# Patient Record
Sex: MALE | Race: WHITE | URBAN - METROPOLITAN AREA
[De-identification: names, ages, dates, MRNs, and addresses within clinical notes are randomized per-mention and may not be internally consistent; named-entity substitution may affect disease eponyms.]

---

## 2017-09-08 ENCOUNTER — INPATIENT (INPATIENT)
Facility: HOSPITAL | Age: 67
LOS: 1 days | Discharge: HOME | End: 2017-09-10
Attending: HOSPITALIST | Admitting: HOSPITALIST
Payer: COMMERCIAL

## 2017-09-08 PROCEDURE — 93010 ELECTROCARDIOGRAM REPORT: CPT

## 2017-09-09 VITALS
OXYGEN SATURATION: 98 % | TEMPERATURE: 99 F | HEART RATE: 74 BPM | DIASTOLIC BLOOD PRESSURE: 76 MMHG | RESPIRATION RATE: 17 BRPM | SYSTOLIC BLOOD PRESSURE: 145 MMHG

## 2017-09-09 LAB
ALBUMIN SERPL ELPH-MCNC: 3.9 G/DL — SIGNIFICANT CHANGE UP (ref 3.3–5)
ALP SERPL-CCNC: 69 U/L — SIGNIFICANT CHANGE UP (ref 40–120)
ALT FLD-CCNC: 35 U/L — SIGNIFICANT CHANGE UP (ref 12–78)
ANION GAP SERPL CALC-SCNC: 10 MMOL/L — SIGNIFICANT CHANGE UP (ref 5–17)
ANION GAP SERPL CALC-SCNC: 9 MMOL/L — SIGNIFICANT CHANGE UP (ref 5–17)
APTT BLD: 31 SEC — SIGNIFICANT CHANGE UP (ref 27.5–37.4)
AST SERPL-CCNC: 21 U/L — SIGNIFICANT CHANGE UP (ref 15–37)
BASOPHILS # BLD AUTO: 0.1 K/UL — SIGNIFICANT CHANGE UP (ref 0–0.2)
BASOPHILS NFR BLD AUTO: 1.2 % — SIGNIFICANT CHANGE UP (ref 0–2)
BILIRUB SERPL-MCNC: 0.3 MG/DL — SIGNIFICANT CHANGE UP (ref 0.2–1.2)
BUN SERPL-MCNC: 16 MG/DL — SIGNIFICANT CHANGE UP (ref 7–23)
BUN SERPL-MCNC: 17 MG/DL — SIGNIFICANT CHANGE UP (ref 7–23)
CALCIUM SERPL-MCNC: 8.3 MG/DL — LOW (ref 8.5–10.1)
CALCIUM SERPL-MCNC: 8.6 MG/DL — SIGNIFICANT CHANGE UP (ref 8.5–10.1)
CHLORIDE SERPL-SCNC: 107 MMOL/L — SIGNIFICANT CHANGE UP (ref 96–108)
CHLORIDE SERPL-SCNC: 107 MMOL/L — SIGNIFICANT CHANGE UP (ref 96–108)
CHOLEST SERPL-MCNC: 172 MG/DL — SIGNIFICANT CHANGE UP (ref 10–199)
CO2 SERPL-SCNC: 25 MMOL/L — SIGNIFICANT CHANGE UP (ref 22–31)
CO2 SERPL-SCNC: 26 MMOL/L — SIGNIFICANT CHANGE UP (ref 22–31)
CREAT SERPL-MCNC: 0.67 MG/DL — SIGNIFICANT CHANGE UP (ref 0.5–1.3)
CREAT SERPL-MCNC: 0.82 MG/DL — SIGNIFICANT CHANGE UP (ref 0.5–1.3)
EOSINOPHIL # BLD AUTO: 0.1 K/UL — SIGNIFICANT CHANGE UP (ref 0–0.5)
EOSINOPHIL NFR BLD AUTO: 1.3 % — SIGNIFICANT CHANGE UP (ref 0–6)
GLUCOSE SERPL-MCNC: 106 MG/DL — HIGH (ref 70–99)
GLUCOSE SERPL-MCNC: 166 MG/DL — HIGH (ref 70–99)
HCT VFR BLD CALC: 48.2 % — SIGNIFICANT CHANGE UP (ref 39–50)
HDLC SERPL-MCNC: 35 MG/DL — LOW (ref 40–125)
HGB BLD-MCNC: 16.8 G/DL — SIGNIFICANT CHANGE UP (ref 13–17)
INR BLD: 1.03 RATIO — SIGNIFICANT CHANGE UP (ref 0.88–1.16)
LIPID PNL WITH DIRECT LDL SERPL: 74 MG/DL — SIGNIFICANT CHANGE UP
LYMPHOCYTES # BLD AUTO: 1.5 K/UL — SIGNIFICANT CHANGE UP (ref 1–3.3)
LYMPHOCYTES # BLD AUTO: 20 % — SIGNIFICANT CHANGE UP (ref 13–44)
MCHC RBC-ENTMCNC: 30.9 PG — SIGNIFICANT CHANGE UP (ref 27–34)
MCHC RBC-ENTMCNC: 34.9 GM/DL — SIGNIFICANT CHANGE UP (ref 32–36)
MCV RBC AUTO: 88.5 FL — SIGNIFICANT CHANGE UP (ref 80–100)
MONOCYTES # BLD AUTO: 0.5 K/UL — SIGNIFICANT CHANGE UP (ref 0–0.9)
MONOCYTES NFR BLD AUTO: 7.1 % — SIGNIFICANT CHANGE UP (ref 2–14)
NEUTROPHILS # BLD AUTO: 5.3 K/UL — SIGNIFICANT CHANGE UP (ref 1.8–7.4)
NEUTROPHILS NFR BLD AUTO: 70.4 % — SIGNIFICANT CHANGE UP (ref 43–77)
PLATELET # BLD AUTO: 255 K/UL — SIGNIFICANT CHANGE UP (ref 150–400)
POTASSIUM SERPL-MCNC: 3.7 MMOL/L — SIGNIFICANT CHANGE UP (ref 3.5–5.3)
POTASSIUM SERPL-MCNC: 3.7 MMOL/L — SIGNIFICANT CHANGE UP (ref 3.5–5.3)
POTASSIUM SERPL-SCNC: 3.7 MMOL/L — SIGNIFICANT CHANGE UP (ref 3.5–5.3)
POTASSIUM SERPL-SCNC: 3.7 MMOL/L — SIGNIFICANT CHANGE UP (ref 3.5–5.3)
PROT SERPL-MCNC: 7.3 GM/DL — SIGNIFICANT CHANGE UP (ref 6–8.3)
PROTHROM AB SERPL-ACNC: 11.1 SEC — SIGNIFICANT CHANGE UP (ref 9.8–12.7)
RBC # BLD: 5.44 M/UL — SIGNIFICANT CHANGE UP (ref 4.2–5.8)
RBC # FLD: 11.8 % — SIGNIFICANT CHANGE UP (ref 10.3–14.5)
SODIUM SERPL-SCNC: 142 MMOL/L — SIGNIFICANT CHANGE UP (ref 135–145)
SODIUM SERPL-SCNC: 142 MMOL/L — SIGNIFICANT CHANGE UP (ref 135–145)
TOTAL CHOLESTEROL/HDL RATIO MEASUREMENT: 4.9 RATIO — SIGNIFICANT CHANGE UP (ref 3.4–9.6)
TRIGL SERPL-MCNC: 313 MG/DL — HIGH (ref 10–149)
TROPONIN I SERPL-MCNC: <0.015 NG/ML — SIGNIFICANT CHANGE UP (ref 0.01–0.04)
WBC # BLD: 7.5 K/UL — SIGNIFICANT CHANGE UP (ref 3.8–10.5)
WBC # FLD AUTO: 7.5 K/UL — SIGNIFICANT CHANGE UP (ref 3.8–10.5)

## 2017-09-09 PROCEDURE — 99285 EMERGENCY DEPT VISIT HI MDM: CPT

## 2017-09-09 PROCEDURE — 72125 CT NECK SPINE W/O DYE: CPT | Mod: 26

## 2017-09-09 PROCEDURE — 93010 ELECTROCARDIOGRAM REPORT: CPT

## 2017-09-09 PROCEDURE — 93306 TTE W/DOPPLER COMPLETE: CPT | Mod: 26

## 2017-09-09 PROCEDURE — 70450 CT HEAD/BRAIN W/O DYE: CPT | Mod: 26

## 2017-09-09 PROCEDURE — 71010: CPT | Mod: 26

## 2017-09-09 PROCEDURE — 73523 X-RAY EXAM HIPS BI 5/> VIEWS: CPT | Mod: 26

## 2017-09-09 RX ORDER — ASPIRIN/CALCIUM CARB/MAGNESIUM 324 MG
325 TABLET ORAL DAILY
Qty: 0 | Refills: 0 | Status: DISCONTINUED | OUTPATIENT
Start: 2017-09-09 | End: 2017-09-10

## 2017-09-09 RX ORDER — ESCITALOPRAM OXALATE 10 MG/1
2.5 TABLET, FILM COATED ORAL DAILY
Qty: 0 | Refills: 0 | Status: DISCONTINUED | OUTPATIENT
Start: 2017-09-09 | End: 2017-09-10

## 2017-09-09 RX ORDER — ACETAMINOPHEN 500 MG
650 TABLET ORAL EVERY 6 HOURS
Qty: 0 | Refills: 0 | Status: DISCONTINUED | OUTPATIENT
Start: 2017-09-09 | End: 2017-09-10

## 2017-09-09 RX ORDER — HEPARIN SODIUM 5000 [USP'U]/ML
5000 INJECTION INTRAVENOUS; SUBCUTANEOUS EVERY 8 HOURS
Qty: 0 | Refills: 0 | Status: DISCONTINUED | OUTPATIENT
Start: 2017-09-09 | End: 2017-09-10

## 2017-09-09 RX ORDER — ATORVASTATIN CALCIUM 80 MG/1
40 TABLET, FILM COATED ORAL AT BEDTIME
Qty: 0 | Refills: 0 | Status: DISCONTINUED | OUTPATIENT
Start: 2017-09-09 | End: 2017-09-10

## 2017-09-09 RX ORDER — SODIUM CHLORIDE 9 MG/ML
1000 INJECTION INTRAMUSCULAR; INTRAVENOUS; SUBCUTANEOUS
Qty: 0 | Refills: 0 | Status: DISCONTINUED | OUTPATIENT
Start: 2017-09-09 | End: 2017-09-10

## 2017-09-09 RX ORDER — POTASSIUM CHLORIDE 20 MEQ
20 PACKET (EA) ORAL ONCE
Qty: 0 | Refills: 0 | Status: COMPLETED | OUTPATIENT
Start: 2017-09-09 | End: 2017-09-09

## 2017-09-09 RX ADMIN — Medication 20 MILLIEQUIVALENT(S): at 11:47

## 2017-09-09 RX ADMIN — HEPARIN SODIUM 5000 UNIT(S): 5000 INJECTION INTRAVENOUS; SUBCUTANEOUS at 21:41

## 2017-09-09 RX ADMIN — ATORVASTATIN CALCIUM 40 MILLIGRAM(S): 80 TABLET, FILM COATED ORAL at 21:41

## 2017-09-09 RX ADMIN — HEPARIN SODIUM 5000 UNIT(S): 5000 INJECTION INTRAVENOUS; SUBCUTANEOUS at 13:47

## 2017-09-09 RX ADMIN — ESCITALOPRAM OXALATE 2.5 MILLIGRAM(S): 10 TABLET, FILM COATED ORAL at 15:11

## 2017-09-09 RX ADMIN — Medication 325 MILLIGRAM(S): at 13:47

## 2017-09-09 NOTE — PROGRESS NOTE ADULT - SUBJECTIVE AND OBJECTIVE BOX
pt admitted with syncope after drinking ~4 glasses   of wine and not sleeping well. no events on tele.  states he has had EKG changes but sure exactly and d/w dr austin.    ICU Vital Signs Last 24 Hrs  T(C): 37 (09 Sep 2017 10:08), Max: 37 (09 Sep 2017 10:08)  T(F): 98.6 (09 Sep 2017 10:08), Max: 98.6 (09 Sep 2017 10:08)  HR: 74 (09 Sep 2017 10:08) (74 - 74)  BP: 145/76 (09 Sep 2017 10:08) (145/76 - 145/76)  BP(mean): --  ABP: --  ABP(mean): --  RR: 17 (09 Sep 2017 10:08) (17 - 17)  SpO2: 98% (09 Sep 2017 10:08) (98% - 98%)  GEN: lying in bed, NAD  HEENT:   forehead lac with sutures in place, pupils equal and reactive, EOMI  CV:  +S1, +S2, RRR  RESP:   lungs clear to auscultation bilaterally, no wheeze, rales, rhonchi   BREAST:  not examined  GI:  abdomen soft, non-tender, non-distended, normoactive BS  RECTAL:  not examined  :  not examined  MSK:   normal muscle tone  EXT:  no edema  NEURO:  AAOX3, no focal neurological deficits  SKIN:  no rashes                              16.8   7.5   )-----------( 255      ( 08 Sep 2017 23:31 )             48.2     09-09    142  |  107  |  16  ----------------------------<  106<H>  3.7   |  26  |  0.67    Ca    8.6      09 Sep 2017 06:38    TPro  7.3  /  Alb  3.9  /  TBili  0.3  /  DBili  x   /  AST  21  /  ALT  35  /  AlkPhos  69  09-08    CARDIAC MARKERS ( 09 Sep 2017 06:38 )  <0.015 ng/mL / x     / x     / x     / x      CARDIAC MARKERS ( 09 Sep 2017 03:41 )  <0.015 ng/mL / x     / x     / x     / x      CARDIAC MARKERS ( 08 Sep 2017 23:31 )  <0.015 ng/mL / x     / x     / x     / x          LIVER FUNCTIONS - ( 08 Sep 2017 23:31 )  Alb: 3.9 g/dL / Pro: 7.3 gm/dL / ALK PHOS: 69 U/L / ALT: 35 U/L / AST: 21 U/L / GGT: x           PT/INR - ( 08 Sep 2017 23:31 )   PT: 11.1 sec;   INR: 1.03 ratio         PTT - ( 08 Sep 2017 23:31 )  PTT:31.0 sec      a/p pt admitted with syncope    # syncope - cardiac vs 2/2 etoh and dehydration  - given EKG chagnes cardio would like 24 hrs more monitoring and then further workup as outpt with dr tubbs  - trop negative  - urged absitinence from ETOH and  improved rest     # ETOH - wife at bedsdie pt more social drinker but will monitor for withdrawl              dispo - likley home tmrw

## 2017-09-09 NOTE — PHYSICAL THERAPY INITIAL EVALUATION ADULT - ADDITIONAL COMMENTS
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 0 LIZETH, 1 flight to bedroom.

## 2017-09-09 NOTE — PHYSICAL THERAPY INITIAL EVALUATION ADULT - PERTINENT HX OF CURRENT PROBLEM, REHAB EVAL
67M presenting via EMS s/p fall.  He reports feeling very tired lately as he has been caring for his 91 y.o. mother in [place].  He came home yesterday evening tired and had 2 glasses of wine with dinner. His wife noticed he had R sided scalp lacerations when she returned home later that night.  It seems he had unwitnessed syncope.  He had fallen down and then gone to lie down. In ER, +dynamic EKG changes and R scalp laceration was sutured.

## 2017-09-10 ENCOUNTER — TRANSCRIPTION ENCOUNTER (OUTPATIENT)
Age: 67
End: 2017-09-10

## 2017-09-10 VITALS
OXYGEN SATURATION: 95 % | TEMPERATURE: 97 F | HEART RATE: 57 BPM | SYSTOLIC BLOOD PRESSURE: 126 MMHG | DIASTOLIC BLOOD PRESSURE: 76 MMHG | RESPIRATION RATE: 18 BRPM

## 2017-09-10 LAB
ALBUMIN SERPL ELPH-MCNC: 3.5 G/DL — SIGNIFICANT CHANGE UP (ref 3.3–5)
ALP SERPL-CCNC: 65 U/L — SIGNIFICANT CHANGE UP (ref 40–120)
ALT FLD-CCNC: 29 U/L — SIGNIFICANT CHANGE UP (ref 12–78)
ANION GAP SERPL CALC-SCNC: 6 MMOL/L — SIGNIFICANT CHANGE UP (ref 5–17)
AST SERPL-CCNC: 21 U/L — SIGNIFICANT CHANGE UP (ref 15–37)
BILIRUB SERPL-MCNC: 0.9 MG/DL — SIGNIFICANT CHANGE UP (ref 0.2–1.2)
BUN SERPL-MCNC: 22 MG/DL — SIGNIFICANT CHANGE UP (ref 7–23)
CALCIUM SERPL-MCNC: 8.9 MG/DL — SIGNIFICANT CHANGE UP (ref 8.5–10.1)
CHLORIDE SERPL-SCNC: 105 MMOL/L — SIGNIFICANT CHANGE UP (ref 96–108)
CO2 SERPL-SCNC: 29 MMOL/L — SIGNIFICANT CHANGE UP (ref 22–31)
CREAT SERPL-MCNC: 0.92 MG/DL — SIGNIFICANT CHANGE UP (ref 0.5–1.3)
GLUCOSE SERPL-MCNC: 100 MG/DL — HIGH (ref 70–99)
HCT VFR BLD CALC: 44.8 % — SIGNIFICANT CHANGE UP (ref 39–50)
HGB BLD-MCNC: 15.6 G/DL — SIGNIFICANT CHANGE UP (ref 13–17)
MCHC RBC-ENTMCNC: 31.1 PG — SIGNIFICANT CHANGE UP (ref 27–34)
MCHC RBC-ENTMCNC: 34.8 GM/DL — SIGNIFICANT CHANGE UP (ref 32–36)
MCV RBC AUTO: 89.2 FL — SIGNIFICANT CHANGE UP (ref 80–100)
PLATELET # BLD AUTO: 216 K/UL — SIGNIFICANT CHANGE UP (ref 150–400)
POTASSIUM SERPL-MCNC: 3.8 MMOL/L — SIGNIFICANT CHANGE UP (ref 3.5–5.3)
POTASSIUM SERPL-SCNC: 3.8 MMOL/L — SIGNIFICANT CHANGE UP (ref 3.5–5.3)
PROT SERPL-MCNC: 6.7 GM/DL — SIGNIFICANT CHANGE UP (ref 6–8.3)
RBC # BLD: 5.02 M/UL — SIGNIFICANT CHANGE UP (ref 4.2–5.8)
RBC # FLD: 11.8 % — SIGNIFICANT CHANGE UP (ref 10.3–14.5)
SODIUM SERPL-SCNC: 140 MMOL/L — SIGNIFICANT CHANGE UP (ref 135–145)
WBC # BLD: 7.6 K/UL — SIGNIFICANT CHANGE UP (ref 3.8–10.5)
WBC # FLD AUTO: 7.6 K/UL — SIGNIFICANT CHANGE UP (ref 3.8–10.5)

## 2017-09-10 RX ORDER — ESCITALOPRAM OXALATE 10 MG/1
1 TABLET, FILM COATED ORAL
Qty: 0 | Refills: 0 | DISCHARGE
Start: 2017-09-10

## 2017-09-10 RX ORDER — INFLUENZA VIRUS VACCINE 15; 15; 15; 15 UG/.5ML; UG/.5ML; UG/.5ML; UG/.5ML
0.5 SUSPENSION INTRAMUSCULAR ONCE
Qty: 0 | Refills: 0 | Status: COMPLETED | OUTPATIENT
Start: 2017-09-10 | End: 2017-09-10

## 2017-09-10 RX ADMIN — Medication 325 MILLIGRAM(S): at 11:49

## 2017-09-10 RX ADMIN — ESCITALOPRAM OXALATE 2.5 MILLIGRAM(S): 10 TABLET, FILM COATED ORAL at 11:51

## 2017-09-10 RX ADMIN — INFLUENZA VIRUS VACCINE 0.5 MILLILITER(S): 15; 15; 15; 15 SUSPENSION INTRAMUSCULAR at 11:50

## 2017-09-10 RX ADMIN — HEPARIN SODIUM 5000 UNIT(S): 5000 INJECTION INTRAVENOUS; SUBCUTANEOUS at 05:49

## 2017-09-10 NOTE — DISCHARGE NOTE ADULT - HOSPITAL COURSE
pt is 66 yo male with PMH of CAD s/p stent here with syncope after prolonged sleeplessness and increased ETOH intake.  pt had Echo which showed nl LV function and no events on monitor.  scalp lac sutured by plastics in ER and pt given instructions have sutures removed by PMD in 12 days.  pt also aware to have stress test done with dr tubbs tomorrow in office.   due to HR 40s beta blocker held on discharge and advised to hold off ETOH.  explained to pt an family and son who is RN at [employer].    ICU Vital Signs Last 24 Hrs  T(C): 36.2 (10 Sep 2017 10:33), Max: 37.2 (09 Sep 2017 19:55)  T(F): 97.2 (10 Sep 2017 10:33), Max: 99 (09 Sep 2017 19:55)  HR: 57 (10 Sep 2017 10:33) (46 - 65)  BP: 126/76 (10 Sep 2017 10:33) (123/56 - 163/72)  BP(mean): --  ABP: --  ABP(mean): --  RR: 18 (10 Sep 2017 10:33) (18 - 18)  SpO2: 95% (10 Sep 2017 10:33) (92% - 95%)  GEN: lying in bed, NAD  HEENT:   NC scalp lac , pupils equal and reactive, EOMI  CV:  +S1, +S2, RRR  RESP:   lungs clear to auscultation bilaterally, no wheeze, rales, rhonchi   BREAST:  not examined  GI:  abdomen soft, non-tender, non-distended, normoactive BS  RECTAL:  not examined  :  not examined  MSK:   normal muscle tone  EXT:  no edema  NEURO:  AAOX3, no focal neurological deficits  SKIN:  no rashes                              15.6   7.6   )-----------( 216      ( 10 Sep 2017 05:49 )             44.8     09-10    140  |  105  |  22  ----------------------------<  100<H>  3.8   |  29  |  0.92    Ca    8.9      10 Sep 2017 05:49    TPro  6.7  /  Alb  3.5  /  TBili  0.9  /  DBili  x   /  AST  21  /  ALT  29  /  AlkPhos  65  09-10    CARDIAC MARKERS ( 09 Sep 2017 06:38 )  <0.015 ng/mL / x     / x     / x     / x      CARDIAC MARKERS ( 09 Sep 2017 03:41 )  <0.015 ng/mL / x     / x     / x     / x      CARDIAC MARKERS ( 08 Sep 2017 23:31 )  <0.015 ng/mL / x     / x     / x     / x          LIVER FUNCTIONS - ( 10 Sep 2017 05:49 )  Alb: 3.5 g/dL / Pro: 6.7 gm/dL / ALK PHOS: 65 U/L / ALT: 29 U/L / AST: 21 U/L / GGT: x           PT/INR - ( 08 Sep 2017 23:31 )   PT: 11.1 sec;   INR: 1.03 ratio         PTT - ( 08 Sep 2017 23:31 )  PTT:31.0 sec                  < from: Transthoracic Echocardiogram (09.09.17 @ 12:50) >  Fibrocalcific changes noted to the mitral valve leaflets with preserved   leaflet excursion.   EA reversal of the mitral inflow consistent with reduced compliance of   the   left ventricle.   Trace mitral regurgitation is present.   Fibrocalcific changes noted to the Aortic valve leaflets with preserved   leaflet excursion.   Trace aortic regurgitation is present.   Trace tricuspid valve regurgitation is present.   Mild pulmonary hypertension.   Pulmonic valve not well seen, probably normal pulmonic valve function.   Trace pulmonic valvular regurgitation is present.   Normal appearing left atrium.    < end of copied text >    - plan for outpt stress tomorrow  - d/c beta blocker and since BP within nl parameters he can d/w dr tubbs prior to starting any other agents

## 2017-09-10 NOTE — DISCHARGE NOTE ADULT - CARE PROVIDER_API CALL
Hannah Esteves), Cardiology; Interventional Cardiology  [address] [place] [address]  Phone: [phone]  Fax: [phone]    your primary care provider,   Phone: (   )    -  Fax: (   )    -

## 2017-09-10 NOTE — DISCHARGE NOTE ADULT - CARE PLAN
Principal Discharge DX:	Syncope, non cardiac  Goal:	decrease alcohol intake and decrease stress  Instructions for follow-up, activity and diet:	stress test tomorrow in dr hurd office

## 2017-09-10 NOTE — DISCHARGE NOTE ADULT - MEDICATION SUMMARY - MEDICATIONS TO TAKE
I will START or STAY ON the medications listed below when I get home from the hospital:    finasteride 1 mg oral tablet  -- 1 tab(s) by mouth once a day  -- Indication: For prostate medicine    escitalopram  -- 1 tab(s) by mouth once a day  -- Indication: For mood     atorvastatin 20 mg oral tablet  -- 1 tab(s) by mouth once a day  -- Indication: For cholesterol medicine

## 2017-09-10 NOTE — DISCHARGE NOTE ADULT - PATIENT PORTAL LINK FT
“You can access the [employer] Patient Portal, offered by [facility], by registering with the following website: [email or web address]”

## 2017-09-10 NOTE — PROGRESS NOTE ADULT - SUBJECTIVE AND OBJECTIVE BOX
Patient is a 67y old  Male who presents with a chief complaint of syncope .    HPI:  He has a h/o CAD s/p stent 2013 , he was admitted after he passed out. He has been under stress dure to sick mother & he did not  not sleep for a few days & he consumed several glasses of wine & passed out. he has been alert & oriented since admission. He has been ambulating . He denies any recent h/o chest pain, SOB or dizziness. he has been asymptomatic since admission. he is in NSR on tel.        Transthoracic Echocardiogram 09.09.17   Summary     Fibrocalcific changes noted to the mitral valve leaflets with preserved   leaflet excursion.   EA reversal of the mitral inflow consistent with reduced compliance of   the   left ventricle.   Trace mitral regurgitation is present.   Fibrocalcific changes noted to the Aortic valve leaflets with preserved   leaflet excursion.   Trace aortic regurgitation is present.   Trace tricuspid valve regurgitation is present.   Mild pulmonary hypertension.   Pulmonic valve not well seen, probably normal pulmonic valve function.   Trace pulmonic valvular regurgitation is present.   Normal appearing left atrium.   The left ventricle is normal in size, wall thickness, wall motion and   contractility.   Estimated left ventricular ejection fraction is 50-55 %.   Normal appearing right atrium.   Normal appearing right ventricle structure and function.          MEDICATIONS  (STANDING):  heparin  Injectable 5000 Unit(s) SubCutaneous every 8 hours  sodium chloride 0.9%. 1000 milliLiter(s) (65 mL/Hr) IV Continuous <Continuous>  aspirin 325 milliGRAM(s) Oral daily  atorvastatin 40 milliGRAM(s) Oral at bedtime  escitalopram 2.5 milliGRAM(s) Oral daily  influenza   Vaccine 0.5 milliLiter(s) IntraMuscular once    MEDICATIONS  (PRN):  acetaminophen   Tablet. 650 milliGRAM(s) Oral every 6 hours PRN Mild Pain (1 - 3)          REVIEW OF SYSTEM:  Pertinent items are noted in HPI.  Constitutional	Negative for chills, fevers, sweats.    Eyes: 	Negative for visual disturbance.  Ears, nose, mouth, throat, and face: Negative for epistaxis, nasal congestion, sore throat and tinnitus.  Neck:	Negative for enlargement, pain and difficulty in swallowing  Respiration : Negative for cough, dyspnea on exertion, pleuritic chest pain and wheezing  Cardiovascular: Negative for chest pain, dyspnea and palpitations    Gastrointestinal : Negative for abdominal pain, diarrhea, nausea and vomiting  Genitourinary: Negative for dysuria, frequency and urinary incontinence .  Skin: Negative for  rash, pruritus, swelling, dryness .  	  Hematologic/lymphatic: Negative for bleeding and easy bruising  Musculoskeletal: Negative for arthralgias, back pain and muscle weakness.  Neurological: Negative for dizziness, headaches, seizures and tremors  Behavioral/Psych: Negative for mood change, depression.  Endocrine:	Negative for blurry vision, polydipsia and polyuria, diaphoresis.   Allergic/Immunologic:	Negative for anaphylaxis, angioedema and urticaria.      Vital Signs Last 24 Hrs  T(C): 36.5 (10 Sep 2017 05:17), Max: 37.2 (09 Sep 2017 19:55)  T(F): 97.7 (10 Sep 2017 05:17), Max: 99 (09 Sep 2017 19:55)  HR: 49 (10 Sep 2017 05:17) (46 - 74)  BP: 138/72 (10 Sep 2017 05:17) (123/56 - 163/72)  BP(mean): --  RR: 18 (09 Sep 2017 19:55) (17 - 18)  SpO2: 95% (10 Sep 2017 05:17) (92% - 98%)    I&O's Summary    PHYSICAL EXAM  General Appearance: cooperative, no acute distress,   HEENT: PERRL, conjunctiva clear, EOM's intact, non injected pharynx, no exudate .  Neck: Supple, , no adenopathy, thyroid: not enlarged, no carotid bruit or JVD  Back: Symmetric, no  tenderness,no soft tissue tenderness  Lungs: Clear to auscultation bilateral,no adventitious breath sounds, normal   expiratory phase  Heart: Regular rate and rhythm, S1, S2 normal, no murmur, rub or gallop  Abdomen: Soft, non-tender, bowel sounds active , no hepatosplenomegaly  Extremities: no cyanosis or edema, no joint swelling  Skin: Skin color, texture normal, no rashes   Neurologic: Alert and oriented X3 , cranial nerves intact, sensory and motor normal,        INTERPRETATION OF TELEMETRY: NSR     ECG: NSR IVCD NSSST changes        LABS:                          15.6   7.6   )-----------( 216      ( 10 Sep 2017 05:49 )             44.8     09-10    140  |  105  |  22  ----------------------------<  100<H>  3.8   |  29  |  0.92    Ca    8.9      10 Sep 2017 05:49    TPro  6.7  /  Alb  3.5  /  TBili  0.9  /  DBili  x   /  AST  21  /  ALT  29  /  AlkPhos  65  09-10    CARDIAC MARKERS ( 09 Sep 2017 06:38 )  <0.015 ng/mL / x     / x     / x     / x      CARDIAC MARKERS ( 09 Sep 2017 03:41 )  <0.015 ng/mL / x     / x     / x     / x      CARDIAC MARKERS ( 08 Sep 2017 23:31 )  <0.015 ng/mL / x     / x     / x     / x          Lipid Panel  172  35  74  313      PT/INR - ( 08 Sep 2017 23:31 )   PT: 11.1 sec;   INR: 1.03 ratio         PTT - ( 08 Sep 2017 23:31 )  PTT:31.0 sec          RADIOLOGY & ADDITIONAL STUDIES:

## 2017-09-10 NOTE — PROGRESS NOTE ADULT - ASSESSMENT
Syncope probably due to lack of sleep & wine.  CAD no recent angina or CHF.  Suggest  Increase PO fluids.  Advised pt to stop ETOH consumption.  Ambulate.  Check for orthostasis.  Out pt stress .  Discussed with pts wife .  Pt was advised to see DR Esteves for a stress test tomorrow.

## 2017-09-14 DIAGNOSIS — S01.81XA LACERATION WITHOUT FOREIGN BODY OF OTHER PART OF HEAD, INITIAL ENCOUNTER: ICD-10-CM

## 2017-09-14 DIAGNOSIS — I25.10 ATHEROSCLEROTIC HEART DISEASE OF NATIVE CORONARY ARTERY WITHOUT ANGINA PECTORIS: ICD-10-CM

## 2017-09-14 DIAGNOSIS — E86.0 DEHYDRATION: ICD-10-CM

## 2017-09-14 DIAGNOSIS — E78.00 PURE HYPERCHOLESTEROLEMIA, UNSPECIFIED: ICD-10-CM

## 2017-09-14 DIAGNOSIS — Z95.5 PRESENCE OF CORONARY ANGIOPLASTY IMPLANT AND GRAFT: ICD-10-CM

## 2017-09-14 DIAGNOSIS — Z72.89 OTHER PROBLEMS RELATED TO LIFESTYLE: ICD-10-CM
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
## 2017-09-14 DIAGNOSIS — Z72.820 SLEEP DEPRIVATION: ICD-10-CM

## 2017-09-14 DIAGNOSIS — G47.00 INSOMNIA, UNSPECIFIED: ICD-10-CM
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
## 2019-06-13 ENCOUNTER — INPATIENT (INPATIENT)
Facility: HOSPITAL | Age: 69
LOS: 1 days | Discharge: HOME | End: 2019-06-15
Attending: INTERNAL MEDICINE | Admitting: INTERNAL MEDICINE
Payer: COMMERCIAL

## 2019-06-13 ENCOUNTER — TRANSCRIPTION ENCOUNTER (OUTPATIENT)
Age: 69
End: 2019-06-13

## 2019-06-13 VITALS — HEIGHT: 71 IN | WEIGHT: 173.06 LBS

## 2019-06-13 LAB
ALBUMIN SERPL ELPH-MCNC: 3.9 G/DL — SIGNIFICANT CHANGE UP (ref 3.3–5)
ALP SERPL-CCNC: 71 U/L — SIGNIFICANT CHANGE UP (ref 40–120)
ALT FLD-CCNC: 26 U/L — SIGNIFICANT CHANGE UP (ref 12–78)
ANION GAP SERPL CALC-SCNC: 6 MMOL/L — SIGNIFICANT CHANGE UP (ref 5–17)
APTT BLD: 34.3 SEC — SIGNIFICANT CHANGE UP (ref 27.5–36.3)
AST SERPL-CCNC: 22 U/L — SIGNIFICANT CHANGE UP (ref 15–37)
BILIRUB SERPL-MCNC: 0.6 MG/DL — SIGNIFICANT CHANGE UP (ref 0.2–1.2)
BUN SERPL-MCNC: 18 MG/DL — SIGNIFICANT CHANGE UP (ref 7–23)
CALCIUM SERPL-MCNC: 9.5 MG/DL — SIGNIFICANT CHANGE UP (ref 8.5–10.1)
CHLORIDE SERPL-SCNC: 104 MMOL/L — SIGNIFICANT CHANGE UP (ref 96–108)
CO2 SERPL-SCNC: 30 MMOL/L — SIGNIFICANT CHANGE UP (ref 22–31)
CREAT SERPL-MCNC: 0.98 MG/DL — SIGNIFICANT CHANGE UP (ref 0.5–1.3)
GLUCOSE BLDC GLUCOMTR-MCNC: 95 MG/DL — SIGNIFICANT CHANGE UP (ref 70–99)
GLUCOSE SERPL-MCNC: 98 MG/DL — SIGNIFICANT CHANGE UP (ref 70–99)
HCT VFR BLD CALC: 47.4 % — SIGNIFICANT CHANGE UP (ref 39–50)
HGB BLD-MCNC: 16.5 G/DL — SIGNIFICANT CHANGE UP (ref 13–17)
INR BLD: 1.09 RATIO — SIGNIFICANT CHANGE UP (ref 0.88–1.16)
MAGNESIUM SERPL-MCNC: 2.5 MG/DL — SIGNIFICANT CHANGE UP (ref 1.6–2.6)
MCHC RBC-ENTMCNC: 31.5 PG — SIGNIFICANT CHANGE UP (ref 27–34)
MCHC RBC-ENTMCNC: 34.8 GM/DL — SIGNIFICANT CHANGE UP (ref 32–36)
MCV RBC AUTO: 90.5 FL — SIGNIFICANT CHANGE UP (ref 80–100)
NT-PROBNP SERPL-SCNC: 83 PG/ML — SIGNIFICANT CHANGE UP (ref 0–125)
PLATELET # BLD AUTO: 236 K/UL — SIGNIFICANT CHANGE UP (ref 150–400)
POTASSIUM SERPL-MCNC: 4.4 MMOL/L — SIGNIFICANT CHANGE UP (ref 3.5–5.3)
POTASSIUM SERPL-SCNC: 4.4 MMOL/L — SIGNIFICANT CHANGE UP (ref 3.5–5.3)
PROT SERPL-MCNC: 7.7 GM/DL — SIGNIFICANT CHANGE UP (ref 6–8.3)
PROTHROM AB SERPL-ACNC: 12.1 SEC — SIGNIFICANT CHANGE UP (ref 10–12.9)
RBC # BLD: 5.24 M/UL — SIGNIFICANT CHANGE UP (ref 4.2–5.8)
RBC # FLD: 13 % — SIGNIFICANT CHANGE UP (ref 10.3–14.5)
SODIUM SERPL-SCNC: 140 MMOL/L — SIGNIFICANT CHANGE UP (ref 135–145)
TROPONIN I SERPL-MCNC: <0.015 NG/ML — SIGNIFICANT CHANGE UP (ref 0.01–0.04)
TROPONIN I SERPL-MCNC: <0.015 NG/ML — SIGNIFICANT CHANGE UP (ref 0.01–0.04)
WBC # BLD: 5.36 K/UL — SIGNIFICANT CHANGE UP (ref 3.8–10.5)
WBC # FLD AUTO: 5.36 K/UL — SIGNIFICANT CHANGE UP (ref 3.8–10.5)

## 2019-06-13 PROCEDURE — 71045 X-RAY EXAM CHEST 1 VIEW: CPT | Mod: 26

## 2019-06-13 PROCEDURE — 99285 EMERGENCY DEPT VISIT HI MDM: CPT

## 2019-06-13 PROCEDURE — 70496 CT ANGIOGRAPHY HEAD: CPT | Mod: 26

## 2019-06-13 PROCEDURE — 93010 ELECTROCARDIOGRAM REPORT: CPT

## 2019-06-13 PROCEDURE — 99223 1ST HOSP IP/OBS HIGH 75: CPT

## 2019-06-13 PROCEDURE — 70498 CT ANGIOGRAPHY NECK: CPT | Mod: 26

## 2019-06-13 RX ORDER — NITROGLYCERIN 6.5 MG
0.4 CAPSULE, EXTENDED RELEASE ORAL
Refills: 0 | Status: DISCONTINUED | OUTPATIENT
Start: 2019-06-13 | End: 2019-06-15

## 2019-06-13 RX ORDER — HYDRALAZINE HCL 50 MG
5 TABLET ORAL EVERY 6 HOURS
Refills: 0 | Status: DISCONTINUED | OUTPATIENT
Start: 2019-06-13 | End: 2019-06-14

## 2019-06-13 RX ORDER — ASPIRIN/CALCIUM CARB/MAGNESIUM 324 MG
300 TABLET ORAL DAILY
Refills: 0 | Status: DISCONTINUED | OUTPATIENT
Start: 2019-06-13 | End: 2019-06-13

## 2019-06-13 RX ORDER — ALTEPLASE 100 MG
7 KIT INTRAVENOUS ONCE
Refills: 0 | Status: COMPLETED | OUTPATIENT
Start: 2019-06-13 | End: 2019-06-13

## 2019-06-13 RX ORDER — ASPIRIN/CALCIUM CARB/MAGNESIUM 324 MG
81 TABLET ORAL DAILY
Refills: 0 | Status: DISCONTINUED | OUTPATIENT
Start: 2019-06-13 | End: 2019-06-13

## 2019-06-13 RX ORDER — DEXTROSE MONOHYDRATE, SODIUM CHLORIDE, AND POTASSIUM CHLORIDE 50; .745; 4.5 G/1000ML; G/1000ML; G/1000ML
1000 INJECTION, SOLUTION INTRAVENOUS
Refills: 0 | Status: DISCONTINUED | OUTPATIENT
Start: 2019-06-13 | End: 2019-06-14

## 2019-06-13 RX ORDER — ALTEPLASE 100 MG
64 KIT INTRAVENOUS ONCE
Refills: 0 | Status: COMPLETED | OUTPATIENT
Start: 2019-06-13 | End: 2019-06-13

## 2019-06-13 RX ORDER — ASPIRIN/CALCIUM CARB/MAGNESIUM 324 MG
324 TABLET ORAL ONCE
Refills: 0 | Status: DISCONTINUED | OUTPATIENT
Start: 2019-06-13 | End: 2019-06-13

## 2019-06-13 RX ORDER — ATORVASTATIN CALCIUM 80 MG/1
40 TABLET, FILM COATED ORAL AT BEDTIME
Refills: 0 | Status: DISCONTINUED | OUTPATIENT
Start: 2019-06-13 | End: 2019-06-15

## 2019-06-13 RX ADMIN — DEXTROSE MONOHYDRATE, SODIUM CHLORIDE, AND POTASSIUM CHLORIDE 50 MILLILITER(S): 50; .745; 4.5 INJECTION, SOLUTION INTRAVENOUS at 17:26

## 2019-06-13 RX ADMIN — ALTEPLASE 420 MILLIGRAM(S): KIT at 15:55

## 2019-06-13 RX ADMIN — ATORVASTATIN CALCIUM 40 MILLIGRAM(S): 80 TABLET, FILM COATED ORAL at 21:53

## 2019-06-13 RX ADMIN — ALTEPLASE 64 MILLIGRAM(S): KIT at 16:09

## 2019-06-13 NOTE — ED ADULT NURSE NOTE - CHPI ED NUR SYMPTOMS NEG
no back pain/no congestion/no dizziness/no chills/no diaphoresis/no shortness of breath/no fever/no vomiting/no syncope/no nausea

## 2019-06-13 NOTE — ED PROVIDER NOTE - PROGRESS NOTE DETAILS
Nadia Andrew for attending Dr. Pearce: Dr. Esteves aware pt is in the ED. Nadia Andrew for attending Dr. Pearce: Pt to go to cath lab with Dr. Esteves today. Ramses GALVAN; Endorsed to Dr. Stratton for admission. Aware patient to go to cath lab today; patient took asa 325mg po prehospitally.

## 2019-06-13 NOTE — DISCHARGE NOTE NURSING/CASE MANAGEMENT/SOCIAL WORK - NSDCPEPTSTRK_GEN_ALL_CORE
Stroke warning signs and symptoms/Signs and symptoms of stroke/Call 911 for stroke/Stroke support groups for patients, families, and friends/Need for follow up after discharge/Prescribed medications/Risk factors for stroke/Stroke education booklet

## 2019-06-13 NOTE — PATIENT PROFILE ADULT - STATED REASON FOR ADMISSION
Stated having left-sided facial numbness while he was in the ER that felt worsening while he was in the Cardiac Cath lab, thus a Rapid Response was called.

## 2019-06-13 NOTE — PROGRESS NOTE ADULT - ASSESSMENT
A/P: 69 male who presented with Chest pain and ACS and developed an Acute CVA in the Right MCA territory   Bradycardia  CAD      Plan:  Admit to the ICU    PULM: No Active issues    Cardio: Will trend Teri, No antiplatelet for 24 H provided CTH is negative for a bleed, no BBx because of current Bradycardia. ECHO.  Dr. Esteves will follow    Renal: Cr. Normal    GI: Swallow eval, and if passes bedside assessment will start PO    Neuro: S/P TPA, Lipid profile, statin, ECHO,  CTH or MRI on 6/14    D/W Neuro, Cardio, and patient

## 2019-06-13 NOTE — CONSULT NOTE ADULT - SUBJECTIVE AND OBJECTIVE BOX
Patient is a 69y old  Male who presents with a chief complaint of CP and Code stroke called at 3;29 Pm     HPI:  68 yo WM h/o HLD, CAD s/p PCI x 2 in 2013, who p/w chest pain x 24hrs. Chest pain is left sided, 6/10 intensity, non pleuritic, non radiating, started yesterday after playing golf. There are no aggravating or alleviating factors. Pain got worse this am and patient came to the ED for evaluation.    In the Cath Lab he told Dr. Esteves he had felt L sided facial numbness starting a 1:30 PM. Pt states that his sx started   earlier with mild facial droop,  slurred speech and numbness but never thought about neurological . CODE Stroke called at 3:29-patient  had L sided  numbness and slight L facial droop . CTH negative and at 3:55  received TPA in CT scan.  Patient then transported to the ICU. Now pt symptoms  slowly improving. No prior symptoms like this in past.    PAST MEDICAL & SURGICAL HISTORY:  CAD (coronary artery disease)  HLD (hyperlipidemia)  HTN (hypertension)    Social Hx:  Nonsmoker, no drug or alcohol use    MEDICATIONS  (STANDING):  aspirin Suppository 300 milliGRAM(s) Rectal daily  atorvastatin 40 milliGRAM(s) Oral at bedtime  sodium chloride 0.9% with potassium chloride 20 mEq/L 1000 milliLiter(s) (50 mL/Hr) IV Continuous <Continuous>       Allergies    No Known Allergies    ROS: Pertinent positives in HPI, all other ROS were reviewed and are negative.      Vital Signs Last 24 Hrs  T(C): 36.8 (13 Jun 2019 15:31), Max: 36.8 (13 Jun 2019 13:03)  T(F): 98.2 (13 Jun 2019 15:31), Max: 98.3 (13 Jun 2019 13:03)  HR: 48 (13 Jun 2019 16:30) (43 - 55)  BP: 163/77 (13 Jun 2019 16:30) (145/82 - 163/77)  BP(mean): 96 (13 Jun 2019 16:15) (96 - 96)  RR: 13 (13 Jun 2019 16:30) (10 - 16)  SpO2: 99% (13 Jun 2019 16:30) (96% - 100%)    Constitutional: awake and alert.  HEENT: PERRLA, EOMI,   Neck: Supple.  Respiratory: Breath sounds are clear bilaterally  Cardiovascular: S1 and S2, regular slow rhythm  Gastrointestinal: soft, nontender  Extremities:  no edema  Vascular:  Carotid Bruit - no  Musculoskeletal: no joint swelling/tenderness   Skin: No rashes    Neurological exam:  HF: A x O x 3. Appropriately interactive, normal affect. Speech  mild slurring noted   CN: BARRY, EOMI, VFF, facial sensation  decreased on left side,  mild left facial noted gag intact  Motor: Mild drift left LE minimal weakness left LE compared to RT LE -5/5 no other weakness noted   Sens:  decreased sensation left side of Face>arm and left LE Rt side intact  Reflexes: Symmetric and normal . BJ 2+, BR 2+, KJ 2+, AJ 1+, downgoing toes b/l  Coord:  No FNFA, dysmetria   Gait/Balance: Cannot test    NIHSS: 5      Labs:   06-13    140  |  104  |  18  ----------------------------<  98  4.4   |  30  |  0.98    Ca    9.5      13 Jun 2019 13:25  Mg     2.5     06-13    TPro  7.7  /  Alb  3.9  /  TBili  0.6  /  DBili  x   /  AST  22  /  ALT  26  /  AlkPhos  71  06-13                              16.5   5.36  )-----------( 236      ( 13 Jun 2019 13:25 )             47.4       Radiology:  - CT Head:  < from: CT Angio Neck w/ IV Cont (06.13.19 @ 15:57) >  IMPRESSION:          1.   Right carotid system:  No hemodynamically significant stenosis.        2.   Left carotid system:  No hemodynamically significant stenosis.        3.   Intracranial circulation:  No significant vascular lesion.        4.   Brain:  No significant lesion identified.      Critical value:  I discussed the finding of this report with Dr. Bateman   at 4:00 PM on 06/13/2019.  Critical value policy of the hospital was   followed.  Read back and confirmation of receipt of this communication   was performed.  This verbal communication supplements the text report of   this document.

## 2019-06-13 NOTE — PROGRESS NOTE ADULT - SUBJECTIVE AND OBJECTIVE BOX
Called to a CODE Stroke in the Cath lab    HPI:  68 yo WM h/o HLD, CAD s/p PCI x 2 in 2013, who p/w chest pain x 24hrs. Chest pain is left sided, 6/10 intensity, non pleuritic, non radiating, started yesterday after playing golf. There are no aggravating or alleviating factors. Pain got worse this am and patient came to the ED for evaluation.      In the Cath Lab he told Dr. Esteves he had felt L sided facial numbness starting a 1:30 PM.  CODE Stroke called at 3:29-patient infact had L siided numbness and slight L facial droop  CTH negative and at 3:55  received TPA in CT scan.  Patient then transported to the ICU.                     PAST MEDICAL & SURGICAL HISTORY:  CAD (coronary artery disease)  HLD (hyperlipidemia)  HTN (hypertension)      FAMILY HISTORY:      Social Hx: Non Smoker, occasional ETOH    Allergies    No Known Allergies    Intolerances        Height (cm): 180.34 (06-13 @ 15:31)  Weight (kg): 77.9 (06-13 @ 16:05)  BMI (kg/m2): 24 (06-13 @ 16:05)    ICU Vital Signs Last 24 Hrs  T(C): 36.8 (13 Jun 2019 15:31), Max: 36.8 (13 Jun 2019 13:03)  T(F): 98.2 (13 Jun 2019 15:31), Max: 98.3 (13 Jun 2019 13:03)  HR: 55 (13 Jun 2019 15:31) (43 - 55)  BP: 153/75 (13 Jun 2019 15:31) (145/82 - 153/75)  BP(mean): --  ABP: --  ABP(mean): --  RR: 16 (13 Jun 2019 15:31) (15 - 16)  SpO2: 99% (13 Jun 2019 15:31) (96% - 99%)          I&O's Summary                            16.5   5.36  )-----------( 236      ( 13 Jun 2019 13:25 )             47.4       06-13    140  |  104  |  18  ----------------------------<  98  4.4   |  30  |  0.98    Ca    9.5      13 Jun 2019 13:25  Mg     2.5     06-13    TPro  7.7  /  Alb  3.9  /  TBili  0.6  /  DBili  x   /  AST  22  /  ALT  26  /  AlkPhos  71  06-13      CARDIAC MARKERS ( 13 Jun 2019 13:25 )  <0.015 ng/mL / x     / x     / x     / x      MEDICATIONS  (STANDING):  aspirin enteric coated 81 milliGRAM(s) Oral daily  atorvastatin 40 milliGRAM(s) Oral at bedtime    MEDICATIONS  (PRN):  nitroglycerin     SubLingual 0.4 milliGRAM(s) SubLingual every 5 minutes PRN Chest Pain      DVT Prophylaxis: V    Advanced Directives:  Discussed with:    Visit Information: 85 min    ** Time is exclusive of billed procedures and/or teaching and/or routine family updates.

## 2019-06-13 NOTE — CONSULT NOTE ADULT - SUBJECTIVE AND OBJECTIVE BOX
Chief complaint of chest pain and of left side Numbness (13 Jun 2019 16:35)      HPI:  68 yo male with c/o chest pain since last night. On mild exertion. Has ELLIS. Seen at office today and sent to the ER for further evaluation. Pt was initially brought to the cath lab for ACS work up and possible cardiac cath. During the interview process and pre cath data collection he also c/o left facial numbness, left arm numbness, could not remember his SSN and some slurring of speech.  No chest pain currently and he has no mild ST depression on EKG. He is bradycardic.      PAST MEDICAL & SURGICAL HISTORY:  CAD (coronary artery disease)  MANDY RCA  HLD (hyperlipidemia)  HTN (hypertension)      PREVIOUS CARDIAC WORKUP:      Echo:  9/17  Nml EF. Trace MR, trace AI, trace TR  Stress Test:  9/17  No ischemia  Cardiac Cath:  1/21/16 Non obstructive cardiac cath. Previously March 2013 MANDY of the RCA    ALLERGIES:    No Known Allergies       MEDICATIONS  (STANDING):  aspirin enteric coated 81 milliGRAM(s) Oral daily  atorvastatin 40 milliGRAM(s) Oral at bedtime    MEDICATIONS  (PRN):  nitroglycerin     SubLingual 0.4 milliGRAM(s) SubLingual every 5 minutes PRN Chest Pain      FAMILY HISTORY:   NC        SOCIAL HISTORY:  Nonsmoker. No ETOH abuse. No illicit drugs.     ROS:     Detailed ten system ROS was performed and was negative except for history as eluded to above.    no fever  no chills  no nausea  no vomiting  no diarrhea  no constipation  no melena  no hematochezia  + chest pain  no palpitations  no sob at rest  no dyspnea on exertion  no cough  no wheezing  no anorexia  no headache  no dizziness  no syncope  no weakness  no myalgia  no dysuria  no polyuria  no hematuria     Vital Signs Last 24 Hrs  T(C): 36.8 (13 Jun 2019 15:31), Max: 36.8 (13 Jun 2019 13:03)  T(F): 98.2 (13 Jun 2019 15:31), Max: 98.3 (13 Jun 2019 13:03)  HR: 55 (13 Jun 2019 15:31) (43 - 55)  BP: 153/75 (13 Jun 2019 15:31) (145/82 - 153/75)  RR: 16 (13 Jun 2019 15:31) (15 - 16)  SpO2: 99% (13 Jun 2019 15:31) (96% - 99%)        PHYSICAL EXAM:    General:                Comfortable, AAO X 3, in no distress.  HEENT:                  Atraumatic, PERRLA, EOMI, conjunctiva clear.    Neck:                     Supple, no adenopathy, no thyromegaly, no JVD, no bruit.  Back:                     Symmetric, non tender.  Chest:                    Clear, B/L symmetric air entry, no tachypnea  Heart:                     S1, S2 normal, no gallop, no murmur.  Abdomen:              Soft, non-tender, bowel sounds active. No palpable masses.  Extremities:           no cyanosis, no edema. Peripheral pulses normal.  Skin:                      Skin color, texture normal. No rashes.  Neurologic:            Left facial and upper extremity sensory deficit. Mild left arm paresis      TELEMETRY:  Sinus bradycardia. Rare PVC    ECG:  Sinus bradycardia, Lateral St depression    LABS:                          16.5   5.36  )-----------( 236      ( 13 Jun 2019 13:25 )             47.4     06-13    140  |  104  |  18  ----------------------------<  98  4.4   |  30  |  0.98    Ca    9.5      13 Jun 2019 13:25  Mg     2.5     06-13    TPro  7.7  /  Alb  3.9  /  TBili  0.6  /  DBili  x   /  AST  22  /  ALT  26  /  AlkPhos  71  06-13 06-13 @ 13:25  Trop-I  <0.015    Pro BNP  83 06-13 @ 13:25    PT/INR - ( 13 Jun 2019 13:25 )   PT: 12.1 sec;   INR: 1.09 ratio    PTT - ( 13 Jun 2019 13:25 )  PTT:34.3 sec      RADIOLOGY & ADDITIONAL STUDIES:    Xray Chest 1 View AP/PA. (06.13.19 @ 13:51) >  Impression:  No active disease.

## 2019-06-13 NOTE — CHART NOTE - NSCHARTNOTEFT_GEN_A_CORE
Code Stroke called for 69M w/ CAD s/p PCI in 2013 presented for chest pain.     Pt reports left facial numbness since approximately 12PM, but he didn't notice the numbness until 1:30 as he was distracted by his chest pain. Pt denies any motor weakness.     Vital Signs Last 24 Hrs  T(C): 36.8 (13 Jun 2019 15:31), Max: 36.8 (13 Jun 2019 13:03)  T(F): 98.2 (13 Jun 2019 15:31), Max: 98.3 (13 Jun 2019 13:03)  HR: 55 (13 Jun 2019 15:31) (43 - 55)  BP: 153/75 (13 Jun 2019 15:31) (145/82 - 153/75)  BP(mean): --  RR: 16 (13 Jun 2019 15:31) (15 - 16)  SpO2: 99% (13 Jun 2019 15:31) (96% - 99%)    NIHSS 1 for mild-moderate loss of sensation in left face, arm, and leg but can still sense being touched.     a/p: 69M w/ CAD s/p PCI in 2013 presenting for chest pain with code stroke for left hemiparesthesia while awaiting cardiac cath  - CT head stat  - possible tPA if no hemorrhage seen on CT  - pt still within tPA window  - care per neurology     Pt seen and discussed w/ Dr. Maravilla, Dr. Bateman (neuro), and Dr. Esteves

## 2019-06-13 NOTE — ED PROVIDER NOTE - OBJECTIVE STATEMENT
70 y/o male with a PMHx of HTN, HLD, CAD with stents x2 presents to the ED c/o CP since last night. Nonradiating. Pt notes he has been doing a lot of yard work recently. Pt sent to ED by Dr. Esteves for an abnormal EKG in office. On 81 mg ASA. No hx of PE or DVT. No recent travel. Cardiologist: Dr. Esteves. No other complaints at this time.

## 2019-06-13 NOTE — CONSULT NOTE ADULT - ASSESSMENT
70 yo WM h/o HLD, CAD s/p PCI x 2 in 2013, who p/w chest pain x 24hrs. Chest pain is left sided while in cath lab code stroke called for Acute RT side Hemisensory/ mild motor sx  MCA Ischemic event    Pt with NIHSS scores 5 within TPA window    REc IV TPA Given after discussion with pt and Dr. Maravilla CT/CTA  Neg    Admit to ICU for post TPA protocol.    Follow TPA protocol.     MRI when stable tomorrow.    Aspirin after 24 hrs.    Cardiology f/u.    Will F/u.    Discussed with Pt, Dr. Maravilla, Dr. Esteves and Family.

## 2019-06-13 NOTE — H&P ADULT - HISTORY OF PRESENT ILLNESS
68 yo WM h/o HLD, CAD s/p PCI x 2 in 2013, who p/w chest pain x 24hrs. Chest pain is left sided, 6/10 intensity, non pleuritic, non radiating, started yesterday after playing golf. There are no aggravating or alleviating factors. Pain got worse this am and patient came to the ED for evaluation.      PMHx: as above  PSHx: none  FamHx: father  age 78 from [facility]  SocHx: no smoking, occasional Etoh            REVIEW OF SYSTEMS:    CONSTITUTIONAL: No weakness, No fevers or chills  ENT: No ear ache, No sorethroat  NECK: No pain, No stiffness  RESPIRATORY: No cough, No wheezing, No hemoptysis; No dyspnea  CARDIOVASCULAR: No chest pain, No palpitations  GASTROINTESTINAL: No abd pain, No nausea, No vomiting, No hematemesis, No diarrhea or constipation. No melena, No hematochezia.  GENITOURINARY: No dysuria, No  hematuria  NEUROLOGICAL: No diplopia, No paresthesia, No motor dysfunction  MUSCULOSKELETAL: No arthralgia, No myalgia  SKIN: No rashes, or lesions   PSYCH: no anxiety, no suicidal ideation    All other review of systems is negative unless indicated above    Vital Signs Last 24 Hrs  T(C): 36.8 (2019 13:03), Max: 36.8 (2019 13:03)  T(F): 98.3 (2019 13:03), Max: 98.3 (2019 13:03)  HR: 43 (2019 13:03) (43 - 43)  BP: 145/82 (2019 13:03) (145/82 - 145/82)  BP(mean): --  RR: 15 (2019 13:03) (15 - 15)  SpO2: 96% (2019 13:03) (96% - 96%)    PHYSICAL EXAM:    GENERAL: NAD, Well nourished  HEENT:  NC/AT, EOMI, PERRLA, No scleral icterus, Moist mucous membranes  NECK: Supple, No JVD  CNS:  Alert & Oriented X3, Motor Strength 5/5 B/L upper and lower extremities; DTRs 2+ intact   LUNG: Normal Breath sounds, Clear to auscultation bilaterally, No rales, No rhonchi, No wheezing  HEART: RRR; No murmurs, No rubs  ABDOMEN: +BS, ST/ND/NT  GENITOURINARY: Voiding, Bladder not distended  EXTREMITIES:  2+ Peripheral Pulses, No clubbing, No cyanosis, No tibial edema  MUSCULOSKELTAL: Joints normal ROM, No TTP, No effusion, +TTP on left anterior chest wall over L pectoral muscle  VAGINAL: deferred  SKIN: no rashes  RECTAL: deferred, not indicated  BREAST: deferred                          16.5   5.36  )-----------( 236      ( 2019 13:25 )             47.4     06-    140  |  104  |  18  ----------------------------<  98  4.4   |  30  |  0.98    Ca    9.5      2019 13:25  Mg     2.5         TPro  7.7  /  Alb  3.9  /  TBili  0.6  /  DBili  x   /  AST  22  /  ALT  26  /  AlkPhos  71      Vancomycin levels:   Cultures:     MEDICATIONS  (STANDING):  aspirin enteric coated 81 milliGRAM(s) Oral daily  atorvastatin 40 milliGRAM(s) Oral at bedtime    MEDICATIONS  (PRN):  nitroglycerin     SubLingual 0.4 milliGRAM(s) SubLingual every 5 minutes PRN Chest Pain      all labs reviewed  all imaging reviewed    Ekg: sinus shannan, no ST changes, L axis     A/P:    1. Chest pain r/o ACS:  Admit to telemetry  ASA/Lipitor  hold BBlockers due to bradycardia  NTG prn pain  Cardiology consult for ischemic evaluation

## 2019-06-13 NOTE — ED ADULT NURSE NOTE - OBJECTIVE STATEMENT
A&Ox3, patient comes in with L sided chest pain since last night. patient states he has had an intermittent squeezing pain. patient denies sob/ radiating of pain. patient placed on cardiac monitor. no signs of acute distress noted.

## 2019-06-13 NOTE — CONSULT NOTE ADULT - ASSESSMENT
Chest pain - Unstable angina  Left facial and LUE numbness - r/o acute CVA  Bradycardia  CAD by history  HTN  HLD    Suggest:    Stat Neuro evaluation - CODE Stroke called.  For evaluation of thrombolytic therapy for CVA  Hold Aspirin  Treat Angina with medical management. Hold cardiac cath for now unless he is having ST elevation or worsening angina  He will require CVA work up with JORGE and rhythm monitoring.  Continue statins.  Avoid beta blockers while bradycardic  Permissive hypertension for now because of CVA  D/W pt's wife in detail

## 2019-06-13 NOTE — DISCHARGE NOTE NURSING/CASE MANAGEMENT/SOCIAL WORK - NSDCDPATPORTLINK_GEN_ALL_CORE
You can access the [facility], offered by [facility], by registering with the following website: [email or web address]

## 2019-06-13 NOTE — ED ADULT TRIAGE NOTE - CHIEF COMPLAINT QUOTE
pt presents to ED from MD Esteves's office (cardiology) for eval of cp on exertion since last night. hx htn, hld, cardiac cath 2015. ekg in office showed ST depressions in 2-3 and elevations in AVL as per Cardiology NP Mini report.

## 2019-06-14 LAB
ANION GAP SERPL CALC-SCNC: 7 MMOL/L — SIGNIFICANT CHANGE UP (ref 5–17)
BUN SERPL-MCNC: 19 MG/DL — SIGNIFICANT CHANGE UP (ref 7–23)
CALCIUM SERPL-MCNC: 8.7 MG/DL — SIGNIFICANT CHANGE UP (ref 8.5–10.1)
CHLORIDE SERPL-SCNC: 107 MMOL/L — SIGNIFICANT CHANGE UP (ref 96–108)
CHOLEST SERPL-MCNC: 116 MG/DL — SIGNIFICANT CHANGE UP (ref 10–199)
CO2 SERPL-SCNC: 28 MMOL/L — SIGNIFICANT CHANGE UP (ref 22–31)
CREAT SERPL-MCNC: 0.85 MG/DL — SIGNIFICANT CHANGE UP (ref 0.5–1.3)
ESTIMATED AVERAGE GLUCOSE: 114 MG/DL — SIGNIFICANT CHANGE UP (ref 68–114)
GLUCOSE SERPL-MCNC: 101 MG/DL — HIGH (ref 70–99)
HBA1C BLD-MCNC: 5.6 % — SIGNIFICANT CHANGE UP (ref 4–5.6)
HBA1C BLD-MCNC: 5.6 % — SIGNIFICANT CHANGE UP (ref 4–5.6)
HCT VFR BLD CALC: 45.7 % — SIGNIFICANT CHANGE UP (ref 39–50)
HCV AB S/CO SERPL IA: 0.09 S/CO — SIGNIFICANT CHANGE UP (ref 0–0.99)
HCV AB SERPL-IMP: SIGNIFICANT CHANGE UP
HDLC SERPL-MCNC: 24 MG/DL — LOW
HGB BLD-MCNC: 15.1 G/DL — SIGNIFICANT CHANGE UP (ref 13–17)
LIPID PNL WITH DIRECT LDL SERPL: 56 MG/DL — SIGNIFICANT CHANGE UP
MAGNESIUM SERPL-MCNC: 2.5 MG/DL — SIGNIFICANT CHANGE UP (ref 1.6–2.6)
MCHC RBC-ENTMCNC: 30.3 PG — SIGNIFICANT CHANGE UP (ref 27–34)
MCHC RBC-ENTMCNC: 33 GM/DL — SIGNIFICANT CHANGE UP (ref 32–36)
MCV RBC AUTO: 91.6 FL — SIGNIFICANT CHANGE UP (ref 80–100)
PHOSPHATE SERPL-MCNC: 3.3 MG/DL — SIGNIFICANT CHANGE UP (ref 2.5–4.5)
PLATELET # BLD AUTO: 206 K/UL — SIGNIFICANT CHANGE UP (ref 150–400)
POTASSIUM SERPL-MCNC: 4 MMOL/L — SIGNIFICANT CHANGE UP (ref 3.5–5.3)
POTASSIUM SERPL-SCNC: 4 MMOL/L — SIGNIFICANT CHANGE UP (ref 3.5–5.3)
RBC # BLD: 4.99 M/UL — SIGNIFICANT CHANGE UP (ref 4.2–5.8)
RBC # FLD: 13 % — SIGNIFICANT CHANGE UP (ref 10.3–14.5)
SODIUM SERPL-SCNC: 142 MMOL/L — SIGNIFICANT CHANGE UP (ref 135–145)
TOTAL CHOLESTEROL/HDL RATIO MEASUREMENT: 4.8 RATIO — SIGNIFICANT CHANGE UP (ref 3.4–9.6)
TRIGL SERPL-MCNC: 182 MG/DL — HIGH (ref 10–149)
TROPONIN I SERPL-MCNC: <0.015 NG/ML — SIGNIFICANT CHANGE UP (ref 0.01–0.04)
WBC # BLD: 5.56 K/UL — SIGNIFICANT CHANGE UP (ref 3.8–10.5)
WBC # FLD AUTO: 5.56 K/UL — SIGNIFICANT CHANGE UP (ref 3.8–10.5)

## 2019-06-14 PROCEDURE — 99291 CRITICAL CARE FIRST HOUR: CPT

## 2019-06-14 PROCEDURE — 70551 MRI BRAIN STEM W/O DYE: CPT | Mod: 26

## 2019-06-14 PROCEDURE — 93306 TTE W/DOPPLER COMPLETE: CPT | Mod: 26

## 2019-06-14 RX ORDER — ASPIRIN/CALCIUM CARB/MAGNESIUM 324 MG
325 TABLET ORAL DAILY
Refills: 0 | Status: DISCONTINUED | OUTPATIENT
Start: 2019-06-14 | End: 2019-06-15

## 2019-06-14 RX ORDER — HEPARIN SODIUM 5000 [USP'U]/ML
5000 INJECTION INTRAVENOUS; SUBCUTANEOUS EVERY 8 HOURS
Refills: 0 | Status: DISCONTINUED | OUTPATIENT
Start: 2019-06-14 | End: 2019-06-15

## 2019-06-14 RX ADMIN — ATORVASTATIN CALCIUM 40 MILLIGRAM(S): 80 TABLET, FILM COATED ORAL at 20:52

## 2019-06-14 RX ADMIN — Medication 325 MILLIGRAM(S): at 19:05

## 2019-06-14 RX ADMIN — HEPARIN SODIUM 5000 UNIT(S): 5000 INJECTION INTRAVENOUS; SUBCUTANEOUS at 20:52

## 2019-06-14 NOTE — PROGRESS NOTE ADULT - SUBJECTIVE AND OBJECTIVE BOX
Events Overnight: symptoms resolved    HPI:      70 yo WM h/o HLD, CAD s/p PCI x 2 in 2013, who p/w chest pain x 24hrs.  Troponins negative    In the Cath Lab he told Dr. Esteves he had felt L sided facial numbness starting a 1:30 PM. with diffiult remembering things like his ssn.     Patient received TPA, symptoms resolved, did have brief episode chest pain last night which resolved    PMH:   as above    ROS - Numbness better            thinking clearer            cp resolved            no sob            no abdominal pain            no fever, no chills , no headaches            no extremity pain or numbness      ROS otherwise negative        MEDICATIONS  (STANDING):  atorvastatin 40 milliGRAM(s) Oral at bedtime  sodium chloride 0.9% with potassium chloride 20 mEq/L 1000 milliLiter(s) (50 mL/Hr) IV Continuous <Continuous>    MEDICATIONS  (PRN):  hydrALAZINE Injectable 5 milliGRAM(s) IV Push every 6 hours PRN SBP > 180  nitroglycerin     SubLingual 0.4 milliGRAM(s) SubLingual every 5 minutes PRN Chest Pain       Height (cm): 180.34 (06-13 @ 15:31)  Weight (kg): 77.9 (06-13 @ 16:05)  BMI (kg/m2): 24 (06-13 @ 16:05)    ICU Vital Signs Last 24 Hrs  T(C): 36.9 (14 Jun 2019 04:00), Max: 36.9 (14 Jun 2019 04:00)  T(F): 98.5 (14 Jun 2019 04:00), Max: 98.5 (14 Jun 2019 04:00)  HR: 59 (14 Jun 2019 09:00) (42 - 86)  BP: 116/61 (14 Jun 2019 09:00) (108/46 - 178/113)  BP(mean): 74 (14 Jun 2019 09:00) (62 - 125)  ABP: --  ABP(mean): --  RR: 19 (14 Jun 2019 09:00) (9 - 21)  SpO2: 93% (14 Jun 2019 09:00) (92% - 100%)      Physical Exam:    general - looks well    HEENT - face symmetric,    neck no carotid bruid    neuro NIHSS 0    lungs clear    cv rrr    abdomen - soft non tender    ext warm                          15.1   5.56  )-----------( 206      ( 14 Jun 2019 06:42 )             45.7       06-14    142  |  107  |  19  ----------------------------<  101<H>  4.0   |  28  |  0.85    Ca    8.7      14 Jun 2019 06:42  Phos  3.3     06-14  Mg     2.5     06-14    TPro  7.7  /  Alb  3.9  /  TBili  0.6  /  DBili  x   /  AST  22  /  ALT  26  /  AlkPhos  71  06-13      CARDIAC MARKERS ( 14 Jun 2019 02:13 )  <0.015 ng/mL / x     / x     / x     / x      CARDIAC MARKERS ( 13 Jun 2019 20:14 )  <0.015 ng/mL / x     / x     / x     / x      CARDIAC MARKERS ( 13 Jun 2019 13:25 )  <0.015 ng/mL / x     / x     / x     / x        DVT Prophylaxis:    venodynes, hep subq                                                             Advanced Directives: Full code

## 2019-06-14 NOTE — PROGRESS NOTE ADULT - SUBJECTIVE AND OBJECTIVE BOX
HPI:  68 yo WM h/o HLD, CAD s/p PCI x 2 in 2013, who p/w chest pain x 24hrs. Chest pain is left sided, 6/10 intensity, non pleuritic, non radiating, started yesterday after playing golf. There are no aggravating or alleviating factors. Pain got worse this am and patient came to the ED for evaluation.    In the Cath Lab he told Dr. Esteves he had felt L sided facial numbness starting a 1:30 PM. Pt states that his sx started   earlier with mild facial droop,  slurred speech and numbness but never thought about neurological . CODE Stroke called at 3:29-patient  had L sided  numbness and slight L facial droop . CTH negative and at 3:55  received TPA in CT scan.  Patient then transported to the ICU. Now pt symptoms  slowly improving. No prior symptoms like this in past.  6/14/19 : Pt feeling better with mild left side numbness persisting. No weakness. Speech  better. CP improved. No weakness. Wants to go home.   MEDICATIONS  (STANDING):  atorvastatin 40 milliGRAM(s) Oral at bedtime  sodium chloride 0.9% with potassium chloride 20 mEq/L 1000 milliLiter(s) (50 mL/Hr) IV Continuous <Continuous>      Vital Signs Last 24 Hrs  T(C): 36.9 (14 Jun 2019 04:00), Max: 36.9 (14 Jun 2019 04:00)  T(F): 98.5 (14 Jun 2019 04:00), Max: 98.5 (14 Jun 2019 04:00)  HR: 45 (14 Jun 2019 08:00) (42 - 86)  BP: 139/77 (14 Jun 2019 08:00) (108/46 - 178/113)  BP(mean): 92 (14 Jun 2019 08:00) (62 - 125)  RR: 18 (14 Jun 2019 08:00) (9 - 21)  SpO2: 97% (14 Jun 2019 08:00) (92% - 100%)    Neurological exam:  HF: A x O x 3. Appropriately interactive, normal affect. Speech  normal today  CN: BARRY, EOMI, VFF, facial sensation  decreased on left side, with slight improvement ,facial weakness improved noted gag intact  Motor: No drift left side today  no weakness today noted   Sens:  decreased sensation left side of Face and arm  less in leg better than yesterday  Reflexes: Symmetric and normal . BJ 2+, BR 2+, KJ 2+, AJ 1+, downgoing toes b/l  Coord:  No FNFA, dysmetria   Gait/Balance: Cannot test    NIHSS: 2                        15.1   5.56  )-----------( 206      ( 14 Jun 2019 06:42 )             45.7     06-14    142  |  107  |  19  ----------------------------<  101<H>  4.0   |  28  |  0.85    Ca    8.7      14 Jun 2019 06:42  Phos  3.3     06-14  Mg     2.5     06-14    TPro  7.7  /  Alb  3.9  /  TBili  0.6  /  DBili  x   /  AST  22  /  ALT  26  /  AlkPhos  71  06-13      Radiology report:  - CT Head  < from: CT Angio Neck w/ IV Cont (06.13.19 @ 15:57) >  IMPRESSION:          1.   Right carotid system:  No hemodynamically significant stenosis.        2.   Left carotid system:  No hemodynamically significant stenosis.        3.   Intracranial circulation:  No significant vascular lesion.        4.   Brain:  No significant lesion identified.      Critical value:  I discussed the finding of this report with Dr. Bateman   at 4:00 PM on 06/13/2019.  Critical value policy of the hospital was   followed.  Read back and confirmation of receipt of this communication   was performed.  This verbal communication supplements the text report of   this document.

## 2019-06-14 NOTE — PHYSICAL THERAPY INITIAL EVALUATION ADULT - PRECAUTIONS/LIMITATIONS, REHAB EVAL
fall precautions/bedrest 24 hrs [facility] tPA, [id number] 06/14 bedrest 24 hrs post tPA/fall precautions cardiac precautions/fall precautions/bedrest 24 hrs post tPA

## 2019-06-14 NOTE — PROGRESS NOTE ADULT - ASSESSMENT
Patient admitted with chest pain, with acute stroke  symptoms improved  for MRI today  if no bleed will start antiplatelet agent  cath on hold  possible JORGE monday  oob to chair  ambulate  statin Patient admitted with chest pain, with acute stroke  symptoms improved  for MRI today  if no bleed will start antiplatelet agent  cath on hold  possible JORGE monday  oob to chair  ambulate  statin  bblocker held for bradycardia

## 2019-06-14 NOTE — SWALLOW BEDSIDE ASSESSMENT ADULT - COMMENTS
Pt was admitted for cardiac cath after an abnormal EKG in his doctor's office. In cath lab a Code Stroke was called due to onset of slurred speech, Pt was admitted for cardiac cath after an abnormal EKG in his doctor's office. In cath lab a Code Stroke was called due to onset of slurred speech, and left sided weakness/hemisensory deficits which are improving. He has an underlying history of CAD s/p stent placements, HTN, and HLD. The patient was admitted for a cardiac cath after an abnormal EKG at his doctor's office. In cath lab,  a Code Stroke was called due to onset of slurred speech, and left sided weakness/hemisensory deficits which are improving. He has an underlying history of CAD s/p stent placements, HTN, and HLD.

## 2019-06-14 NOTE — SWALLOW BEDSIDE ASSESSMENT ADULT - SWALLOW EVAL: RECOMMENDED DIET
SUGGEST A DASH/TLC REGULAR TEXTURE DIET WITH THIN LIQUID CONSISTENCIES AS THE PATIENT APPEARS CLINICALLY TOLERANT OF THESE FOOD CONSISTENCIES FROM AN OROPHARYNGEAL SWALLOWING STANCE ON CLINICAL EXAM.

## 2019-06-14 NOTE — PROGRESS NOTE ADULT - SUBJECTIVE AND OBJECTIVE BOX
68 yo WM h/o HLD, CAD s/p PCI x 2 in 2013, who p/w chest pain x 24hrs. Chest pain is left sided, 6/10 intensity, non pleuritic, non radiating, started yesterday after playing golf. There are no aggravating or alleviating factors. Pain got worse this am and patient came to the ED for evaluation.  Hosp course sign for L Face numbness and ICU transfer, s/p TPA    6.14: no distress, no paresthesia       REVIEW OF SYSTEMS:    CONSTITUTIONAL: No weakness, No fevers or chills  ENT: No ear ache, No sorethroat  NECK: No pain, No stiffness  RESPIRATORY: No cough, No wheezing, No hemoptysis; No dyspnea  CARDIOVASCULAR: No chest pain, No palpitations  GASTROINTESTINAL: No abd pain, No nausea, No vomiting, No hematemesis, No diarrhea or constipation. No melena, No hematochezia.  GENITOURINARY: No dysuria, No  hematuria  NEUROLOGICAL: No diplopia, No paresthesia, No motor dysfunction  MUSCULOSKELETAL: No arthralgia, No myalgia  SKIN: No rashes, or lesions   PSYCH: no anxiety, no suicidal ideation    All other review of systems is negative unless indicated above    Vital Signs Last 24 Hrs  T(C): 36.9 (14 Jun 2019 04:00), Max: 36.9 (14 Jun 2019 04:00)  T(F): 98.5 (14 Jun 2019 04:00), Max: 98.5 (14 Jun 2019 04:00)  HR: 57 (14 Jun 2019 14:00) (42 - 86)  BP: 98/70 (14 Jun 2019 14:00) (98/70 - 178/113)  BP(mean): 77 (14 Jun 2019 14:00) (62 - 125)  RR: 16 (14 Jun 2019 14:00) (9 - 21)  SpO2: 98% (14 Jun 2019 14:00) (92% - 100%)    PHYSICAL EXAM:    GENERAL: NAD, Well nourished  HEENT:  NC/AT, EOMI, PERRLA, No scleral icterus, Moist mucous membranes  NECK: Supple, No JVD  CNS:  Alert & Oriented X3, Motor Strength 5/5 B/L upper and lower extremities; DTRs 2+ intact   LUNG: Normal Breath sounds, Clear to auscultation bilaterally, No rales, No rhonchi, No wheezing  HEART: RRR; No murmurs, No rubs  ABDOMEN: +BS, ST/ND/NT  GENITOURINARY: Voiding, Bladder not distended  EXTREMITIES:  2+ Peripheral Pulses, No clubbing, No cyanosis, No tibial edema  MUSCULOSKELTAL: Joints normal ROM, No TTP, No effusion, +TTP on left anterior chest wall over L pectoral muscle  VAGINAL: deferred  SKIN: no rashes  RECTAL: deferred, not indicated  BREAST: deferred               Labs:                        15.1   5.56  )-----------( 206      ( 14 Jun 2019 06:42 )             45.7     06-14    142  |  107  |  19  ----------------------------<  101<H>  4.0   |  28  |  0.85    Ca    8.7      14 Jun 2019 06:42  Phos  3.3     06-14  Mg     2.5     06-14    TPro  7.7  /  Alb  3.9  /  TBili  0.6  /  DBili  x   /  AST  22  /  ALT  26  /  AlkPhos  71  06-13         MEDICATIONS  (STANDING):  aspirin 325 milliGRAM(s) Oral daily  atorvastatin 40 milliGRAM(s) Oral at bedtime  heparin  Injectable 5000 Unit(s) SubCutaneous every 8 hours    all labs reviewed  all imaging reviewed    Ekg: sinus shannan, no ST changes, L axis     A/P:    1. Chest pain r/o ACS:  Admit to telemetry  trops negative  ASA/Lipitor  hold BBlockers due to bradycardia  NTG prn pain  Cardiology consult for ischemic evaluation    2. Left face paresthesia:   r/o CVA: s/p TPA, now asymptomatic   for MRI brain  Speech and Swallow normal  cw ASA/Statins

## 2019-06-14 NOTE — PHYSICAL THERAPY INITIAL EVALUATION ADULT - PERTINENT HX OF CURRENT PROBLEM, REHAB EVAL
p/w CP x 24hrs. CP is L sided, non pleuritic, non radiating, started day before adm after playing golf. no aggravating or alleviating factors. Pain got worse next am and patient came to the ED for evaluation.  Code stroke called in cath lab when pt c/o mild facial droop, slurred speech and L facial numbness.  CTH negative and at 3:55  received TPA in CT scan. CTA neg. p/w CP x 24hrs. CP is L sided, non pleuritic, non radiating, started day before adm after playing golf. no aggravating or alleviating factors. Pain got worse next am and patient came to the ED for evaluation.  Code stroke called in cath lab when pt c/o mild facial droop, slurred speech and L facial numbness.  CTH negative and at 3:55  received TPA in CT scan. CTA neg.  MRI shows 1 cm focus of increased signal within the LEFT posterior cortex likely gliosis due to prior insult p/w CP x 24hrs. CP is L sided, non pleuritic, non radiating, started day before adm after playing golf. no aggravating or alleviating factors. Pain got worse next am and patient came to the ED for evaluation.  Code stroke called in cath lab when pt c/o mild facial droop, slurred speech and L facial numbness.  CTH negative and at 3:55  received TPA in CT scan. CTA neg.  MRI shows 1 cm focus of increased signal within the LEFT posterior cortex likely gliosis due to prior insult. trop (-)

## 2019-06-14 NOTE — SWALLOW BEDSIDE ASSESSMENT ADULT - SWALLOW EVAL: DIAGNOSIS
1) The pt exhibits Oropharyngeal Swallowing abilities which subjectively appear to be stable and within functional parameters for age. NO behavioral aspiration signs exhibited on exam. Odynophagia was denied.   2) The pt was alert and interactive. He was oriented x3+ and able to verbalize during communicative probes and in conversation via intelligible, fluent, linguistically intact, contextually appropriate utterances. No dysarthria, verbal apraxia or aphasia were evident on exam. At reported communicative baseline.

## 2019-06-14 NOTE — PROGRESS NOTE ADULT - SUBJECTIVE AND OBJECTIVE BOX
68 yo male with c/o chest pain since last night. On mild exertion. Has ELLIS. Seen at office today and sent to the ER for further evaluation. Pt was initially brought to the cath lab for ACS work up and possible cardiac cath. During the interview process and pre cath data collection he also c/o left facial numbness, left arm numbness, could not remember his SSN and some slurring of speech.  No chest pain currently and he has no mild ST depression on EKG. He is bradycardic. He was given tPA and reported improvement in his symptoms.    Today,       PAST MEDICAL & SURGICAL HISTORY:  CAD (coronary artery disease)  MANDY RCA  HLD (hyperlipidemia)  HTN (hypertension)      PREVIOUS CARDIAC WORKUP:      Echo:  9/17  Nml EF. Trace MR, trace AI, trace TR  Stress Test:  9/17  No ischemia  Cardiac Cath:  1/21/16 Non obstructive cardiac cath. Previously March 2013 MANDY of the RCA    Allergies:   No Known Allergies      MEDICATIONS  (STANDING):  atorvastatin 40 milliGRAM(s) Oral at bedtime  sodium chloride 0.9% with potassium chloride 20 mEq/L 1000 milliLiter(s) (50 mL/Hr) IV Continuous <Continuous>    MEDICATIONS  (PRN):  hydrALAZINE Injectable 5 milliGRAM(s) IV Push every 6 hours PRN SBP > 180  nitroglycerin     SubLingual 0.4 milliGRAM(s) SubLingual every 5 minutes PRN Chest Pain      ROS:     Detailed ten system ROS was performed and was negative except for history as eluded to above.    no fever  no chills  no nausea  no vomiting  no diarrhea  no constipation  no melena  no hematochezia  no chest pain  no palpitations  no sob at rest  no dyspnea on exertion  no cough  no wheezing  no anorexia  no headache  no dizziness  no syncope  no weakness  no myalgia  no dysuria  no polyuria  no hematuria       Vital Signs Last 24 Hrs  T(C): 36.9 (14 Jun 2019 04:00), Max: 36.9 (14 Jun 2019 04:00)  T(F): 98.5 (14 Jun 2019 04:00), Max: 98.5 (14 Jun 2019 04:00)  HR: 46 (14 Jun 2019 06:00) (43 - 86)  BP: 108/60 (14 Jun 2019 06:00) (108/46 - 178/113)  BP(mean): 71 (14 Jun 2019 06:00) (62 - 125)  RR: 16 (14 Jun 2019 06:00) (9 - 21)  SpO2: 94% (14 Jun 2019 06:00) (93% - 100%)    I&O's Summary    13 Jun 2019 07:01  -  14 Jun 2019 06:39  --------------------------------------------------------  IN: 723 mL / OUT: 1300 mL / NET: -577 mL        PHYSICAL EXAM:    General:                Comfortable, AAO X 3, in no distress.  HEENT:                  Atraumatic, PERRLA, EOMI, conjunctiva clear.    Neck:                     Supple, no adenopathy, no thyromegaly, no JVD, no bruit.  Back:                     Symmetric, non tender.  Chest:                    Clear, B/L symmetric air entry, no tachypnea  Heart:                     S1, S2 normal, no gallop, no murmur.  Abdomen:              Soft, non-tender, bowel sounds active. No palpable masses.  Extremities:           no cyanosis, no edema. Peripheral pulses normal.  Skin:                      Skin color, texture normal. No rashes.  Neurologic:            Left facial and upper extremity sensory deficit. Mild left arm paresis      TELEMETRY:  Sinus bradycardia. Rare PVC    ECG:  Sinus bradycardia, Lateral St depression    LABS:                        16.5   5.36  )-----------( 236      ( 13 Jun 2019 13:25 )             47.4     06-13    140  |  104  |  18  ----------------------------<  98  4.4   |  30  |  0.98    Ca    9.5      13 Jun 2019 13:25  Mg     2.5     06-13    TPro  7.7  /  Alb  3.9  /  TBili  0.6  /  DBili  x   /  AST  22  /  ALT  26  /  AlkPhos  71  06-13          06-14 @ 02:13  Trop-I <0.015    06-13 @ 20:14  Trop-I <0.015    06-13 @ 13:25  Trop-I <0.015    Pro BNP  83 06-13 @ 13:25    PT/INR - ( 13 Jun 2019 13:25 )   PT: 12.1 sec;   INR: 1.09 ratio    PTT - ( 13 Jun 2019 13:25 )  PTT:34.3 sec      RADIOLOGY & ADDITIONAL STUDIES:    CT Head and CT Angio Head & Neck w/ IV Cont (06.13.19 @ 15:57) >  IMPRESSION:       1.   Right carotid system:  No hemodynamically significant stenosis.    2.   Left carotid system:  No hemodynamically significant stenosis.    3.   Intracranial circulation:  No significant vascular lesion.    4.   Brain:  No significant lesion identified. 70 yo male with c/o chest pain since last night. On mild exertion. Has ELLIS. Seen at office today and sent to the ER for further evaluation. Pt was initially brought to the cath lab for ACS work up and possible cardiac cath. During the interview process and pre cath data collection he also c/o left facial numbness, left arm numbness, could not remember his SSN and some slurring of speech.  No chest pain currently and he has no mild ST depression on EKG. He is bradycardic. He was given tPA and reported improvement in his symptoms.    Today, he feels fine. No more numbness of the face or the left arm. No other deficit. No c/o chest pain.       PAST MEDICAL & SURGICAL HISTORY:  CAD (coronary artery disease)  MANDY RCA  HLD (hyperlipidemia)  HTN (hypertension)      PREVIOUS CARDIAC WORKUP:      Echo:  9/17  Nml EF. Trace MR, trace AI, trace TR  Stress Test:  9/17  No ischemia  Cardiac Cath:  1/21/16 Non obstructive cardiac cath. Previously March 2013 MANDY of the RCA    Allergies:   No Known Allergies      MEDICATIONS  (STANDING):  atorvastatin 40 milliGRAM(s) Oral at bedtime  sodium chloride 0.9% with potassium chloride 20 mEq/L 1000 milliLiter(s) (50 mL/Hr) IV Continuous <Continuous>    MEDICATIONS  (PRN):  hydrALAZINE Injectable 5 milliGRAM(s) IV Push every 6 hours PRN SBP > 180  nitroglycerin     SubLingual 0.4 milliGRAM(s) SubLingual every 5 minutes PRN Chest Pain      ROS:     Detailed ten system ROS was performed and was negative except for history as eluded to above.    no fever  no chills  no nausea  no vomiting  no diarrhea  no constipation  no melena  no hematochezia  no chest pain  no palpitations  no sob at rest  no dyspnea on exertion  no cough  no wheezing  no anorexia  no headache  no dizziness  no syncope  no weakness  no myalgia  no dysuria  no polyuria  no hematuria       Vital Signs Last 24 Hrs  T(C): 36.9 (14 Jun 2019 04:00), Max: 36.9 (14 Jun 2019 04:00)  T(F): 98.5 (14 Jun 2019 04:00), Max: 98.5 (14 Jun 2019 04:00)  HR: 46 (14 Jun 2019 06:00) (43 - 86)  BP: 108/60 (14 Jun 2019 06:00) (108/46 - 178/113)  BP(mean): 71 (14 Jun 2019 06:00) (62 - 125)  RR: 16 (14 Jun 2019 06:00) (9 - 21)  SpO2: 94% (14 Jun 2019 06:00) (93% - 100%)    I&O's Summary    13 Jun 2019 07:01  -  14 Jun 2019 06:39  --------------------------------------------------------  IN: 723 mL / OUT: 1300 mL / NET: -577 mL        PHYSICAL EXAM:    General:                Comfortable, AAO X 3, in no distress.  HEENT:                  Atraumatic, PERRLA, EOMI, conjunctiva clear.    Neck:                     Supple, no adenopathy, no thyromegaly, no JVD, no bruit.  Back:                     Symmetric, non tender.  Chest:                    Clear, B/L symmetric air entry, no tachypnea  Heart:                     S1, S2 normal, no gallop, no murmur.  Abdomen:              Soft, non-tender, bowel sounds active. No palpable masses.  Extremities:           no cyanosis, no edema. Peripheral pulses normal.  Skin:                      Skin color, texture normal. No rashes.  Neurologic:            Left facial and upper extremity sensory deficit. Mild left arm paresis      TELEMETRY:  Sinus bradycardia. Rare PVC    ECG:  Sinus bradycardia, Lateral St depression    LABS:                        16.5   5.36  )-----------( 236      ( 13 Jun 2019 13:25 )             47.4     06-13    140  |  104  |  18  ----------------------------<  98  4.4   |  30  |  0.98    Ca    9.5      13 Jun 2019 13:25  Mg     2.5     06-13    TPro  7.7  /  Alb  3.9  /  TBili  0.6  /  DBili  x   /  AST  22  /  ALT  26  /  AlkPhos  71  06-13      06-14 @ 02:13  Trop-I <0.015    06-13 @ 20:14  Trop-I <0.015    06-13 @ 13:25  Trop-I <0.015    Pro BNP  83 06-13 @ 13:25    PT/INR - ( 13 Jun 2019 13:25 )   PT: 12.1 sec;   INR: 1.09 ratio    PTT - ( 13 Jun 2019 13:25 )  PTT:34.3 sec      RADIOLOGY & ADDITIONAL STUDIES:    CT Head and CT Angio Head & Neck w/ IV Cont (06.13.19 @ 15:57) >  IMPRESSION:       1.   Right carotid system:  No hemodynamically significant stenosis.    2.   Left carotid system:  No hemodynamically significant stenosis.    3.   Intracranial circulation:  No significant vascular lesion.    4.   Brain:  No significant lesion identified.

## 2019-06-14 NOTE — SWALLOW BEDSIDE ASSESSMENT ADULT - SWALLOW EVAL: CRITERIA FOR SKILLED INTERVENTION MET
NO NEED FOR SPEECH OR SWALLOWING THERAPY. PT'S SPEECH-LANGUAGE AND OROPHARYNGEAL SWALLOWING ABILITIES ARE WITHIN FUNCTIONAL PARAMETERS/AT REPORTED BASELINE. GIVEN ABOVE, WILL NOT ACTIVELY FOLLOW. RECONSULT PRN.

## 2019-06-14 NOTE — PROGRESS NOTE ADULT - ASSESSMENT
· Assessment		  68 yo WM h/o HLD, CAD s/p PCI x 2 in 2013, who p/w chest pain x 24hrs. Chest pain is left sided while in cath lab code stroke called for Acute RT side Hemisensory/ mild motor sx  MCA Ischemic event    POst TPA day #1     In  ICU for post TPA protocol.  Follow TPA protocol.   MRI today  Aspirin after 24 hrs.  Statin.  Cardiology f/u.  OOB today Ambulate if stable.  Discussed with Pt, Dr. Elise, Dr. Esteves and  wife at bedside.

## 2019-06-14 NOTE — SWALLOW BEDSIDE ASSESSMENT ADULT - SLP GENERAL OBSERVATIONS
The pt was alert and interactive. He was oriented x3+ and able to verbalize during communicative probes and in conversation via intelligible, fluent, linguistically intact, contextually appropriate utterances. No dysarthria, verbal apraxia or aphasia were evident on exam. At reported communicative baseline. Note that the pt denied Dysphagia.

## 2019-06-14 NOTE — PROGRESS NOTE ADULT - ASSESSMENT
Chest pain - Unstable angina  Left facial and LUE numbness - r/o acute CVA  Bradycardia  CAD by history  HTN  HLD    Suggest: Chest pain - Unstable angina  Left facial and LUE numbness - r/o acute CVA  Bradycardia  CAD by history  HTN  HLD    Suggest:    Medical management of Chest pain and unstable. No further chest pain and no elevation of CE.  Continue ICU care post tPA for CVA.  Neuro follow up  Start antiplatelet therapy when cleared by neuro  Statins  He will require CVA work up with JORGE and rhythm monitoring.  Avoid beta blockers. He is bradycardic.

## 2019-06-15 ENCOUNTER — TRANSCRIPTION ENCOUNTER (OUTPATIENT)
Age: 69
End: 2019-06-15

## 2019-06-15 VITALS
SYSTOLIC BLOOD PRESSURE: 120 MMHG | DIASTOLIC BLOOD PRESSURE: 74 MMHG | HEART RATE: 54 BPM | RESPIRATION RATE: 18 BRPM | TEMPERATURE: 98 F

## 2019-06-15 LAB
ANION GAP SERPL CALC-SCNC: 8 MMOL/L — SIGNIFICANT CHANGE UP (ref 5–17)
BUN SERPL-MCNC: 22 MG/DL — SIGNIFICANT CHANGE UP (ref 7–23)
CALCIUM SERPL-MCNC: 8.7 MG/DL — SIGNIFICANT CHANGE UP (ref 8.5–10.1)
CHLORIDE SERPL-SCNC: 109 MMOL/L — HIGH (ref 96–108)
CO2 SERPL-SCNC: 25 MMOL/L — SIGNIFICANT CHANGE UP (ref 22–31)
CREAT SERPL-MCNC: 0.78 MG/DL — SIGNIFICANT CHANGE UP (ref 0.5–1.3)
GLUCOSE SERPL-MCNC: 109 MG/DL — HIGH (ref 70–99)
HCT VFR BLD CALC: 43.8 % — SIGNIFICANT CHANGE UP (ref 39–50)
HGB BLD-MCNC: 15.1 G/DL — SIGNIFICANT CHANGE UP (ref 13–17)
MCHC RBC-ENTMCNC: 31.1 PG — SIGNIFICANT CHANGE UP (ref 27–34)
MCHC RBC-ENTMCNC: 34.5 GM/DL — SIGNIFICANT CHANGE UP (ref 32–36)
MCV RBC AUTO: 90.3 FL — SIGNIFICANT CHANGE UP (ref 80–100)
PLATELET # BLD AUTO: 192 K/UL — SIGNIFICANT CHANGE UP (ref 150–400)
POTASSIUM SERPL-MCNC: 3.8 MMOL/L — SIGNIFICANT CHANGE UP (ref 3.5–5.3)
POTASSIUM SERPL-SCNC: 3.8 MMOL/L — SIGNIFICANT CHANGE UP (ref 3.5–5.3)
RBC # BLD: 4.85 M/UL — SIGNIFICANT CHANGE UP (ref 4.2–5.8)
RBC # FLD: 12.8 % — SIGNIFICANT CHANGE UP (ref 10.3–14.5)
SODIUM SERPL-SCNC: 142 MMOL/L — SIGNIFICANT CHANGE UP (ref 135–145)
WBC # BLD: 5.23 K/UL — SIGNIFICANT CHANGE UP (ref 3.8–10.5)
WBC # FLD AUTO: 5.23 K/UL — SIGNIFICANT CHANGE UP (ref 3.8–10.5)

## 2019-06-15 PROCEDURE — 99233 SBSQ HOSP IP/OBS HIGH 50: CPT

## 2019-06-15 RX ORDER — CLOPIDOGREL BISULFATE 75 MG/1
75 TABLET, FILM COATED ORAL DAILY
Refills: 0 | Status: DISCONTINUED | OUTPATIENT
Start: 2019-06-15 | End: 2019-06-15

## 2019-06-15 RX ORDER — METOPROLOL TARTRATE 50 MG
0.5 TABLET ORAL
Qty: 0 | Refills: 0 | DISCHARGE

## 2019-06-15 RX ORDER — METOPROLOL TARTRATE 50 MG
12.5 TABLET ORAL
Qty: 0 | Refills: 0 | DISCHARGE

## 2019-06-15 RX ORDER — ASPIRIN/CALCIUM CARB/MAGNESIUM 324 MG
81 TABLET ORAL DAILY
Refills: 0 | Status: DISCONTINUED | OUTPATIENT
Start: 2019-06-15 | End: 2019-06-15

## 2019-06-15 RX ORDER — CLOPIDOGREL BISULFATE 75 MG/1
1 TABLET, FILM COATED ORAL
Qty: 30 | Refills: 0
Start: 2019-06-15

## 2019-06-15 RX ADMIN — CLOPIDOGREL BISULFATE 75 MILLIGRAM(S): 75 TABLET, FILM COATED ORAL at 11:12

## 2019-06-15 RX ADMIN — HEPARIN SODIUM 5000 UNIT(S): 5000 INJECTION INTRAVENOUS; SUBCUTANEOUS at 06:33

## 2019-06-15 RX ADMIN — Medication 81 MILLIGRAM(S): at 11:13

## 2019-06-15 NOTE — PROGRESS NOTE ADULT - ASSESSMENT
Chest pain - Unstable angina  Left facial and LUE numbness - r/o acute CVA  Bradycardia  CAD by history  HTN  HLD    Suggest:  He is feeling better w/o any chest pain on ambulation - recommend medical management of Chest pain    Add plavix and cont ASA but switch to 81mg QD  DC beta blocker due to bradycardia  Out pt w/u to rule out embolic event for CVA (JORGE and rhythm monitoring)   cw statin  d/w pt and PCP    OK for DC

## 2019-06-15 NOTE — PROGRESS NOTE ADULT - ASSESSMENT
· Assessment		  68 yo WM h/o HLD, CAD s/p PCI x 2 in 2013, who p/w chest pain x 24hrs. Chest pain is left sided while in cath lab code stroke called for Acute RT side Hemisensory/ mild motor sx  MCA Ischemic event    POst TPA resolved symptoms    Follow TPA protocol.   MRI  did not reveal any acute pathology 1 cm focus abnormal  signal in left posterior temporal cortex prob likely gliosis due to old injury( not appropriate side)  Can do out pt contrast MRI if needed  Aspirin after 24 hrs.  Statin.  Cardiology f/u.  Pt is ambulating  D/c planning if stable per cardiology.  F/u in office after D/c.  Discussed with Pt.

## 2019-06-15 NOTE — DISCHARGE NOTE PROVIDER - NSDCCPCAREPLAN_GEN_ALL_CORE_FT
PRINCIPAL DISCHARGE DIAGNOSIS  Diagnosis: Chest pain  Assessment and Plan of Treatment: outpt follow up dr tubbs      SECONDARY DISCHARGE DIAGNOSES  Diagnosis: Brain TIA  Assessment and Plan of Treatment:

## 2019-06-15 NOTE — DISCHARGE NOTE PROVIDER - CARE PROVIDER_API CALL
Hannah Esteves)  Cardiology; Interventional Cardiology  [address] [facility], [place] [address]  Phone: [phone]  Fax: [phone]  Follow Up Time:     Demetra Bateman)  Neurology  General  [address], [address]  [place] [address]  Phone: [phone]  Fax: [phone]  Follow Up Time:

## 2019-06-15 NOTE — PROGRESS NOTE ADULT - SUBJECTIVE AND OBJECTIVE BOX
70 yo male with c/o chest pain since last night. On mild exertion. Has ELLIS. Seen at office today and sent to the ER for further evaluation. Pt was initially brought to the cath lab for ACS work up and possible cardiac cath. During the interview process and pre cath data collection he also c/o left facial numbness, left arm numbness, could not remember his SSN and some slurring of speech.  No chest pain currently and he has no mild ST depression on EKG. He is bradycardic. He was given tPA and reported improvement in his symptoms.    The patient was seen and examined. No acute events overnight.  No chest pain.  No shortness of breath.  No palpitations.  Numbness resolved  Review of systems: A 10 point review of system has been performed, and is otherwise negative.     PAST MEDICAL & SURGICAL HISTORY:  CAD (coronary artery disease)  MANDY RCA  HLD (hyperlipidemia)  HTN (hypertension)      PREVIOUS CARDIAC WORKUP:  Echo 6/14/2019   Endocardium is not well visualized,however, overall left ventricular   systolic function appears normal. Technically Difficult Study.   Estimated left ventricular ejection fraction is 50-55 %.   Mild diastolic dysfunction (stage I).   Mild aortic regurgitation.   Mild to moderate tricuspid valve regurgitation.       Stress Test:  9/17  No ischemia  Cardiac Cath:  1/21/16 Non obstructive cardiac cath. Previously March 2013 MANDY of the RCA          PHYSICAL EXAM:    General:                Comfortable, AAO X 3, in no distress.  HEENT:                  Atraumatic, PERRLA, EOMI, conjunctiva clear.    Neck:                     Supple, no adenopathy, no thyromegaly, no JVD, no bruit.  Back:                     Symmetric, non tender.  Chest:                    Clear, B/L symmetric air entry, no tachypnea  Heart:                     S1, S2 normal, no gallop, no murmur.  Abdomen:              Soft, non-tender, bowel sounds active. No palpable masses.  Extremities:           no cyanosis, no edema. Peripheral pulses normal.  Skin:                      Skin color, texture normal. No rashes.  Neurologic:           AAox3      TELEMETRY:  Sinus bradycardia. Rare PVC and couplets    ECG:  Sinus bradycardia, Lateral St depression    LABS:                                           15.1   5.23  )-----------( 192      ( 15 Sotero 2019 06:50 )             43.8          06-15    142  |  109<H>  |  22  ----------------------------<  109<H>  3.8   |  25  |  0.78    Ca    8.7      15 Sotero 2019 06:50  Phos  3.3     06-14  Mg     2.5     06-14    TPro  7.7  /  Alb  3.9  /  TBili  0.6  /  DBili  x   /  AST  22  /  ALT  26  /  AlkPhos  71  06-13    CARDIAC MARKERS ( 14 Jun 2019 02:13 )  <0.015 ng/mL / x     / x     / x     / x      CARDIAC MARKERS ( 13 Jun 2019 20:14 )  <0.015 ng/mL / x     / x     / x     / x      CARDIAC MARKERS ( 13 Jun 2019 13:25 )  <0.015 ng/mL / x     / x     / x     / x          PT/INR - ( 13 Jun 2019 13:25 )   PT: 12.1 sec;   INR: 1.09 ratio         PTT - ( 13 Jun 2019 13:25 )  PTT:34.3 sec        RADIOLOGY & ADDITIONAL STUDIES:    CT Head and CT Angio Head & Neck w/ IV Cont (06.13.19 @ 15:57) >  IMPRESSION:       1.   Right carotid system:  No hemodynamically significant stenosis.    2.   Left carotid system:  No hemodynamically significant stenosis.    3.   Intracranial circulation:  No significant vascular lesion.    4.   Brain:  No significant lesion identified.

## 2019-06-15 NOTE — DISCHARGE NOTE PROVIDER - HOSPITAL COURSE
68 yo WM h/o HLD, CAD s/p PCI x 2 in 2013, who p/w chest pain x 24hrs. Chest pain is left sided, 6/10 intensity, non pleuritic, non radiating, started yesterday after playing golf. There are no aggravating or alleviating factors. Pain got worse this am and patient came to the ED for evaluation.    Hosp course sign for L Face numbness and ICU transfer, s/p TPA        6.14: no distress, no paresthesia     6.15: no distress, MRI negative             REVIEW OF SYSTEMS:        CONSTITUTIONAL: No weakness, No fevers or chills    ENT: No ear ache, No sorethroat    NECK: No pain, No stiffness    RESPIRATORY: No cough, No wheezing, No hemoptysis; No dyspnea    CARDIOVASCULAR: No chest pain, No palpitations    GASTROINTESTINAL: No abd pain, No nausea, No vomiting, No hematemesis, No diarrhea or constipation. No melena, No hematochezia.    GENITOURINARY: No dysuria, No  hematuria    NEUROLOGICAL: No diplopia, No paresthesia, No motor dysfunction    MUSCULOSKELETAL: No arthralgia, No myalgia    SKIN: No rashes, or lesions     PSYCH: no anxiety, no suicidal ideation        All other review of systems is negative unless indicated above        Vital Signs Last 24 Hrs    T(C): 36.9 (14 Jun 2019 04:00), Max: 36.9 (14 Jun 2019 04:00)    T(F): 98.5 (14 Jun 2019 04:00), Max: 98.5 (14 Jun 2019 04:00)    HR: 57 (14 Jun 2019 14:00) (42 - 86)    BP: 98/70 (14 Jun 2019 14:00) (98/70 - 178/113)    BP(mean): 77 (14 Jun 2019 14:00) (62 - 125)    RR: 16 (14 Jun 2019 14:00) (9 - 21)    SpO2: 98% (14 Jun 2019 14:00) (92% - 100%)        PHYSICAL EXAM:        GENERAL: NAD, Well nourished    HEENT:  NC/AT, EOMI, PERRLA, No scleral icterus, Moist mucous membranes    NECK: Supple, No JVD    CNS:  Alert & Oriented X3, Motor Strength 5/5 B/L upper and lower extremities; DTRs 2+ intact     LUNG: Normal Breath sounds, Clear to auscultation bilaterally, No rales, No rhonchi, No wheezing    HEART: RRR; No murmurs, No rubs    ABDOMEN: +BS, ST/ND/NT    GENITOURINARY: Voiding, Bladder not distended    EXTREMITIES:  2+ Peripheral Pulses, No clubbing, No cyanosis, No tibial edema    MUSCULOSKELTAL: Joints normal ROM, No TTP, No effusion, +TTP on left anterior chest wall over L pectoral muscle    VAGINAL: deferred    SKIN: no rashes    RECTAL: deferred, not indicated    BREAST: deferredA/P:        1. Chest pain unlikely  ACS:    trops negative    ASA/Lipitor    stop BBlockers due to bradycardia    Cardiology consult for ischemic evaluation as outpt        2. Left face paresthesia: resolved     likely TIA vs peripheral trigeminal paresthesia     s/p TPA, now asymptomatic     MRI brain: no cva     Speech and Swallow normal    cw ASA/Statins/Plavix    outpt f/u with cardiology for possible radha        dc home, time 38m

## 2019-06-15 NOTE — PROGRESS NOTE ADULT - SUBJECTIVE AND OBJECTIVE BOX
· Subjective and Objective: 	  HPI:  70 yo WM h/o HLD, CAD s/p PCI x 2 in 2013, who p/w chest pain x 24hrs. Chest pain is left sided, 6/10 intensity, non pleuritic, non radiating, started yesterday after playing golf. There are no aggravating or alleviating factors. Pain got worse this am and patient came to the ED for evaluation.    In the Cath Lab he told Dr. Esteves he had felt L sided facial numbness starting a 1:30 PM. Pt states that his sx started   earlier with mild facial droop,  slurred speech and numbness but never thought about neurological . CODE Stroke called at 3:29-patient  had L sided  numbness and slight L facial droop . CTH negative and at 3:55  received TPA in CT scan.  Patient then transported to the ICU. Now pt symptoms  slowly improving. No prior symptoms like this in past.  6/14/19 : Pt feeling better with mild left side numbness persisting. No weakness. Speech  better. CP improved. No weakness. Wants to go home.   6/15/19 : Pt feeling much better. Left side facial numbness  improved   almost resolved.  No New c/o. OOB ambulating. No new c/o. Had mRI scan. wants to go home for father's day.     MEDICATIONS  (STANDING):  aspirin 325 milliGRAM(s) Oral daily  atorvastatin 40 milliGRAM(s) Oral at bedtime  heparin  Injectable 5000 Unit(s) SubCutaneous every 8 hours      Vital Signs Last 24 Hrs  T(C): 36.3 (15 Sotero 2019 05:44), Max: 36.8 (14 Jun 2019 11:00)  T(F): 97.3 (15 Sotero 2019 05:44), Max: 98.3 (14 Jun 2019 20:00)  HR: 41 (15 Sotero 2019 05:44) (41 - 62)  BP: 124/65 (15 Sotero 2019 05:44) (80/67 - 144/70)  BP(mean): 81 (14 Jun 2019 21:00) (70 - 88)  RR: 18 (15 Sotero 2019 05:44) (13 - 21)  SpO2: 96% (15 Sotero 2019 05:44) (93% - 98%)      Neurological exam:  HF: A x O x 3. Appropriately interactive, normal affect. Speech  normal today  CN: BARRY, EOMI, VFF, facial sensation left side resolved  , with slight improvement ,no facial weakness  noted, gag intact  Motor: No drift No weakness  noted   Sens:  Normal sensations on left side  Reflexes: Symmetric and normal . BJ 2+, BR 2+, KJ 2+, AJ 1+, downgoing toes b/l  Coord:  No FNFA, dysmetria   Gait/Balance: OOb ambulating                 15.1   5.23  )-----------( 192      ( 15 Sotero 2019 06:50 )             43.8     06-15    142  |  109<H>  |  22  ----------------------------<  109<H>  3.8   |  25  |  0.78    Ca    8.7      15 Sotero 2019 06:50  Phos  3.3     06-14  Mg     2.5     06-14    TPro  7.7  /  Alb  3.9  /  TBili  0.6  /  DBili  x   /  AST  22  /  ALT  26  /  AlkPhos  71  06-13 06-13 [id number] 5.6    06-14 Chol 116 LDL 56 HDL 24<L> Trig 182<H>    Radiology report:  - MRI brain  < from: MR Head No Cont (06.14.19 @ 11:33) >  IMPRESSION: 1 cm focus of increased signal within the LEFT posterior   temporal cortex likely gliosis due to prior insult. Intravenous contrast   recommended for complete evaluation    - CT Head  < from: CT Angio Neck w/ IV Cont (06.13.19 @ 15:57) >  IMPRESSION:          1.   Right carotid system:  No hemodynamically significant stenosis.        2.   Left carotid system:  No hemodynamically significant stenosis.        3.   Intracranial circulation:  No significant vascular lesion.        4.   Brain:  No significant lesion identified.      Critical value:  I discussed the finding of this report with Dr. Bateman   at 4:00 PM on 06/13/2019.  Critical value policy of the hospital was   followed.  Read back and confirmation of receipt of this communication   was performed.  This verbal communication supplements the text report of   this document.

## 2019-06-18 NOTE — ASU PATIENT PROFILE, ADULT - REASON FOR ADMISSION, PROFILE
I was here a week ago with stroke symptoms that resolved but they don't know what caused it so I need to have this test done today to see if there's anything there

## 2019-06-19 DIAGNOSIS — R00.1 BRADYCARDIA, UNSPECIFIED: ICD-10-CM

## 2019-06-19 DIAGNOSIS — I25.110 ATHEROSCLEROTIC HEART DISEASE OF NATIVE CORONARY ARTERY WITH UNSTABLE ANGINA PECTORIS: ICD-10-CM

## 2019-06-19 DIAGNOSIS — I10 ESSENTIAL (PRIMARY) HYPERTENSION: ICD-10-CM

## 2019-06-19 DIAGNOSIS — G45.9 TRANSIENT CEREBRAL ISCHEMIC ATTACK, UNSPECIFIED: ICD-10-CM

## 2019-06-19 DIAGNOSIS — E78.5 HYPERLIPIDEMIA, UNSPECIFIED: ICD-10-CM

## 2019-06-19 PROBLEM — I25.10 ATHEROSCLEROTIC HEART DISEASE OF NATIVE CORONARY ARTERY WITHOUT ANGINA PECTORIS: Chronic | Status: ACTIVE | Noted: 2019-06-13

## 2019-06-19 RX ORDER — ASPIRIN/CALCIUM CARB/MAGNESIUM 324 MG
1 TABLET ORAL
Qty: 0 | Refills: 0 | DISCHARGE

## 2019-06-20 ENCOUNTER — OUTPATIENT (OUTPATIENT)
Dept: OUTPATIENT SERVICES | Facility: HOSPITAL | Age: 69
LOS: 1 days | Discharge: HOME | End: 2019-06-20

## 2019-06-20 VITALS
SYSTOLIC BLOOD PRESSURE: 136 MMHG | RESPIRATION RATE: 18 BRPM | OXYGEN SATURATION: 98 % | HEART RATE: 46 BPM | DIASTOLIC BLOOD PRESSURE: 69 MMHG

## 2019-06-20 VITALS — HEIGHT: 71 IN | WEIGHT: 169.98 LBS

## 2019-06-20 RX ORDER — TIMOLOL 0.5 %
1 DROPS OPHTHALMIC (EYE)
Qty: 0 | Refills: 0 | DISCHARGE

## 2019-06-20 RX ORDER — FINASTERIDE 5 MG/1
1 TABLET, FILM COATED ORAL
Qty: 0 | Refills: 0 | DISCHARGE

## 2019-06-20 RX ORDER — ASPIRIN/CALCIUM CARB/MAGNESIUM 324 MG
1 TABLET ORAL
Qty: 0 | Refills: 0 | DISCHARGE

## 2019-06-20 RX ORDER — HYDROCORTISONE 20 MG
0 TABLET ORAL
Qty: 0 | Refills: 0 | DISCHARGE

## 2019-06-20 RX ORDER — CLOPIDOGREL BISULFATE 75 MG/1
1 TABLET, FILM COATED ORAL
Qty: 0 | Refills: 0 | DISCHARGE

## 2019-06-20 RX ORDER — ATORVASTATIN CALCIUM 80 MG/1
1 TABLET, FILM COATED ORAL
Qty: 0 | Refills: 0 | DISCHARGE

## 2019-06-20 NOTE — PROCEDURAL SAFETY CHECKLIST WITH OR WITHOUT SEDATION - NSPOSTCOMMENTFT_GEN_ALL_CORE
Pt. given viscous lidocaine, swish and swallow, by Dr. Esteves at 0838; probe inserted without difficulty by Dr. Esteves at 0849; bubble study completed at 0855; probe removed at 0900; ramona. well.

## 2019-06-20 NOTE — PACU DISCHARGE NOTE - COMMENTS
Pt. verb. agreement and understanding to written and verbal discharge instructions.   Pt. discharged to home via private auto accompanied by son.

## 2019-06-25 ENCOUNTER — APPOINTMENT (OUTPATIENT)
Dept: NEUROLOGY | Facility: CLINIC | Age: 69
End: 2019-06-25
Payer: COMMERCIAL

## 2019-06-25 VITALS
SYSTOLIC BLOOD PRESSURE: 118 MMHG | HEIGHT: 71 IN | DIASTOLIC BLOOD PRESSURE: 70 MMHG | BODY MASS INDEX: 24.36 KG/M2 | WEIGHT: 174 LBS | HEART RATE: 53 BPM

## 2019-06-25 DIAGNOSIS — Z92.82 STATUS POST ADMINISTRATION OF TPA (RTPA) IN A DIFFERENT FACILITY WITHIN THE LAST 24 HRS PRIOR TO ADMISSION TO CURRENT FACILITY: ICD-10-CM

## 2019-06-25 DIAGNOSIS — Z86.79 PERSONAL HISTORY OF OTHER DISEASES OF THE CIRCULATORY SYSTEM: ICD-10-CM

## 2019-06-25 DIAGNOSIS — Z86.39 PERSONAL HISTORY OF OTHER ENDOCRINE, NUTRITIONAL AND METABOLIC DISEASE: ICD-10-CM

## 2019-06-25 DIAGNOSIS — Z87.891 PERSONAL HISTORY OF NICOTINE DEPENDENCE: ICD-10-CM

## 2019-06-25 DIAGNOSIS — R47.81 SLURRED SPEECH: ICD-10-CM

## 2019-06-25 DIAGNOSIS — R20.0 ANESTHESIA OF SKIN: ICD-10-CM

## 2019-06-25 DIAGNOSIS — R29.810 FACIAL WEAKNESS: ICD-10-CM

## 2019-06-25 DIAGNOSIS — I34.0 NONRHEUMATIC MITRAL (VALVE) INSUFFICIENCY: ICD-10-CM

## 2019-06-25 DIAGNOSIS — I63.9 CEREBRAL INFARCTION, UNSPECIFIED: ICD-10-CM

## 2019-06-25 DIAGNOSIS — I35.8 OTHER NONRHEUMATIC AORTIC VALVE DISORDERS: ICD-10-CM

## 2019-06-25 PROCEDURE — 99495 TRANSJ CARE MGMT MOD F2F 14D: CPT

## 2019-06-25 RX ORDER — CLOPIDOGREL 75 MG/1
75 TABLET, FILM COATED ORAL
Refills: 0 | Status: ACTIVE | COMMUNITY

## 2019-06-25 RX ORDER — ESCITALOPRAM OXALATE 5 MG/1
5 TABLET, FILM COATED ORAL
Refills: 0 | Status: ACTIVE | COMMUNITY

## 2019-06-25 RX ORDER — FINASTERIDE 1 MG/1
1 TABLET, FILM COATED ORAL
Refills: 0 | Status: ACTIVE | COMMUNITY

## 2019-06-25 RX ORDER — ASPIRIN 81 MG
81 TABLET, DELAYED RELEASE (ENTERIC COATED) ORAL
Refills: 0 | Status: ACTIVE | COMMUNITY

## 2019-06-25 RX ORDER — ATORVASTATIN CALCIUM 20 MG/1
20 TABLET, FILM COATED ORAL
Refills: 0 | Status: ACTIVE | COMMUNITY

## 2019-06-25 RX ORDER — ATORVASTATIN CALCIUM 40 MG/1
40 TABLET, FILM COATED ORAL
Refills: 0 | Status: ACTIVE | COMMUNITY

## 2019-06-25 NOTE — CONSULT LETTER
[Dear  ___] : Dear  [unfilled], [Consult Letter:] : I had the pleasure of evaluating your patient, [unfilled]. [Please see my note below.] : Please see my note below. [Consult Closing:] : Thank you very much for allowing me to participate in the care of this patient.  If you have any questions, please do not hesitate to contact me. [DrMaribeth  ___] : Dr. LOJA [Sincerely,] : Sincerely,

## 2019-06-25 NOTE — PHYSICAL EXAM
[General Appearance - Alert] : alert [General Appearance - In No Acute Distress] : in no acute distress [Oriented To Time, Place, And Person] : oriented to person, place, and time [Impaired Insight] : insight and judgment were intact [Affect] : the affect was normal [Person] : oriented to person [Place] : oriented to place [Concentration Intact] : normal concentrating ability [Time] : oriented to time [Visual Intact] : visual attention was ~T not ~L decreased [Naming Objects] : no difficulty naming common objects [Writing A Sentence] : no difficulty writing a sentence [Repeating Phrases] : no difficulty repeating a phrase [Fluency] : fluency intact [Comprehension] : comprehension intact [Past History] : adequate knowledge of personal past history [Reading] : reading intact [Cranial Nerves Oculomotor (III)] : extraocular motion intact [Cranial Nerves Optic (II)] : visual acuity intact bilaterally,  visual fields full to confrontation, pupils equal round and reactive to light [Cranial Nerves Trigeminal (V)] : facial sensation intact symmetrically [Cranial Nerves Facial (VII)] : face symmetrical [Cranial Nerves Vestibulocochlear (VIII)] : hearing was intact bilaterally [Cranial Nerves Hypoglossal (XII)] : there was no tongue deviation with protrusion [Cranial Nerves Accessory (XI - Cranial And Spinal)] : head turning and shoulder shrug symmetric [Cranial Nerves Glossopharyngeal (IX)] : tongue and palate midline [Motor Strength] : muscle strength was normal in all four extremities [No Muscle Atrophy] : normal bulk in all four extremities [Sensation Tactile Decrease] : light touch was intact [Balance] : balance was intact [Past-pointing] : there was no past-pointing [Tremor] : no tremor present [2+] : Ankle jerk right 2+ [Plantar Reflex Right Only] : normal on the right [Plantar Reflex Left Only] : normal on the left [Sclera] : the sclera and conjunctiva were normal [Extraocular Movements] : extraocular movements were intact [PERRL With Normal Accommodation] : pupils were equal in size, round, reactive to light, with normal accommodation [Oropharynx] : the oropharynx was normal [Outer Ear] : the ears and nose were normal in appearance [Neck Cervical Mass (___cm)] : no neck mass was observed [Jugular Venous Distention Increased] : there was no jugular-venous distention [Neck Appearance] : the appearance of the neck was normal [Thyroid Diffuse Enlargement] : the thyroid was not enlarged [Thyroid Nodule] : there were no palpable thyroid nodules [Auscultation Breath Sounds / Voice Sounds] : lungs were clear to auscultation bilaterally [Heart Rate And Rhythm] : heart rate was normal and rhythm regular [Heart Sounds] : normal S1 and S2 [Murmurs] : no murmurs [Heart Sounds Gallop] : no gallops [Heart Sounds Pericardial Friction Rub] : no pericardial rub [Full Pulse] : the pedal pulses are present [Edema] : there was no peripheral edema [Bowel Sounds] : normal bowel sounds [Abdomen Soft] : soft [Abdomen Tenderness] : non-tender [Abdomen Mass (___ Cm)] : no abdominal mass palpated [No CVA Tenderness] : no ~M costovertebral angle tenderness [Abnormal Walk] : normal gait [No Spinal Tenderness] : no spinal tenderness [Nail Clubbing] : no clubbing  or cyanosis of the fingernails [Musculoskeletal - Swelling] : no joint swelling seen [Motor Tone] : muscle strength and tone were normal [Skin Color & Pigmentation] : normal skin color and pigmentation [Skin Turgor] : normal skin turgor [] : no rash

## 2019-06-25 NOTE — REASON FOR VISIT
[Post Hospitalization] : a post hospitalization visit [FreeTextEntry1] : Follow up for pt with TIA/ CVA with acute onset of Left side hemisensory sx with slurred speech and Facial droop

## 2019-06-25 NOTE — DISCUSSION/SUMMARY
[FreeTextEntry1] : Patient with sudden onset of right hemisensory mild motor symptoms involving left MCA distribution event while in Lab admitted for acute coronary syndrome with chest pain noted to have neurological symptoms given TPA in the hospital with resolved symptoms\par \par With a negative neurological workup subsequently including CT CT angiogram and MRI scan.\par \par Patient had JORGE positive for atrial septal aneurysm and PFO.\par \par Recommended to continue aspirin with Plavix and statin for secondary stroke prevention.\par \par Recommend rheumatology evaluation for hypercoagulable workup for evaluation for source of clot.\par \par Recommend patient to followup with cardiology for PFO closure.\par \par Return for followup evaluation in one to 2 months.\par \par Followup with you in cardiology for other medical problems.\par \par Patient education provided discussed with the patient regarding other medications and side effects.\par \par Discuss with you regarding night sweats and possible medication side effects.

## 2019-06-25 NOTE — HISTORY OF PRESENT ILLNESS
[FreeTextEntry1] : 69-year-old patient with a history of coronary disease status post PCI admitted with chest pain for 24 hours and sudden onset of facial droop and slurred speech with left-sided numbness in the face and body started" stroke was called while patient was in [facility] on 6/13/19 CT scan the head was done negative and TPA was given his initial NIHSS scores were 5. Symptoms resolved later. By the next day complete resolution of symptoms.\par \par Followup MRI scan was negative. Patient was discharged home on aspirin with statin. Patient brought back for JORGE which was reported as atrial septal aneurysm with PFO. Medications were changed to aspirin 81 mg with Plavix 75 mg per day. Complaining about periodically night sweats. Stopped using metoprolol. When to see cardiologist and Coumadin was in hospital for PFO closure.\par \par No other recurrent symptoms or new symptoms no headaches no dizziness no focal neurological symptoms.

## 2020-09-04 NOTE — PROCEDURAL SAFETY CHECKLIST WITH OR WITHOUT SEDATION - NSGUIDEWIRESREMOVEDSD_GEN_ALL_CORE
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 LUTS\par 2. PSA of 11.4 not applicable

## 2021-01-01 ENCOUNTER — EMERGENCY (EMERGENCY)
Facility: HOSPITAL | Age: 71
LOS: 0 days | End: 2021-04-03
Attending: STUDENT IN AN ORGANIZED HEALTH CARE EDUCATION/TRAINING PROGRAM
Payer: MEDICARE

## 2021-01-01 VITALS — HEIGHT: 69 IN | WEIGHT: 179.9 LBS

## 2021-01-01 VITALS — HEIGHT: 71 IN | WEIGHT: 179.9 LBS

## 2021-01-01 DIAGNOSIS — Z20.822 CONTACT WITH AND (SUSPECTED) EXPOSURE TO COVID-19: ICD-10-CM

## 2021-01-01 DIAGNOSIS — I46.9 CARDIAC ARREST, CAUSE UNSPECIFIED: ICD-10-CM

## 2021-01-01 DIAGNOSIS — Z95.5 PRESENCE OF CORONARY ANGIOPLASTY IMPLANT AND GRAFT: ICD-10-CM

## 2021-01-01 DIAGNOSIS — E78.5 HYPERLIPIDEMIA, UNSPECIFIED: ICD-10-CM

## 2021-01-01 DIAGNOSIS — Z79.02 LONG TERM (CURRENT) USE OF ANTITHROMBOTICS/ANTIPLATELETS: ICD-10-CM

## 2021-01-01 DIAGNOSIS — I25.10 ATHEROSCLEROTIC HEART DISEASE OF NATIVE CORONARY ARTERY WITHOUT ANGINA PECTORIS: ICD-10-CM

## 2021-01-01 DIAGNOSIS — Z79.82 LONG TERM (CURRENT) USE OF ASPIRIN: ICD-10-CM

## 2021-01-01 DIAGNOSIS — Z86.73 PERSONAL HISTORY OF TRANSIENT ISCHEMIC ATTACK (TIA), AND CEREBRAL INFARCTION WITHOUT RESIDUAL DEFICITS: ICD-10-CM

## 2021-01-01 DIAGNOSIS — I10 ESSENTIAL (PRIMARY) HYPERTENSION: ICD-10-CM

## 2021-01-01 DIAGNOSIS — I48.91 UNSPECIFIED ATRIAL FIBRILLATION: ICD-10-CM

## 2021-01-01 LAB — SARS-COV-2 RNA SPEC QL NAA+PROBE: SIGNIFICANT CHANGE UP

## 2021-01-01 PROCEDURE — 92950 HEART/LUNG RESUSCITATION CPR: CPT

## 2021-01-01 PROCEDURE — U0005: CPT

## 2021-01-01 PROCEDURE — U0003: CPT

## 2021-01-01 PROCEDURE — 31500 INSERT EMERGENCY AIRWAY: CPT

## 2021-01-01 PROCEDURE — 99285 EMERGENCY DEPT VISIT HI MDM: CPT | Mod: CS,25

## 2021-01-01 PROCEDURE — 82962 GLUCOSE BLOOD TEST: CPT

## 2021-01-01 PROCEDURE — 99291 CRITICAL CARE FIRST HOUR: CPT | Mod: 25

## 2021-04-03 NOTE — ED ADULT TRIAGE NOTE - CHIEF COMPLAINT QUOTE
pt presents to ED in cardiac arrest. per EMS, pt was last seen 3 hours PTA by wife who states pt went to work out in their home gym. pt found down by wife, 911 called. upon EMS arrival, [facility] had initiated CPR for approximately 5 minutes. AED applied by EMS, 1 shock from EMS. upon arrival to ED, pt in asystole. pt brought straight to trauma for eval.

## 2021-04-03 NOTE — ED PROVIDER NOTE - TIME PATIENT WAS PRONOUNCED DEAD
Incidentally discovered during workup in ER a week ago for viral gastroenteritis   Workup per labs  Mentzner score suggestive of thalassemia over iron deficiency anemia, pt's cause possibly multifactorial   No signs of GI bleeding/GI cause for his anemia at this time   Discussed iron rich diet  Continue Fe TID as prescribed in ER until lab results are back     03-Apr-2021 13:22

## 2021-04-03 NOTE — ED ADULT NURSE NOTE - OBJECTIVE STATEMENT
pt presents to ED in cardiac arrest. per EMS, pt was last seen 3 hours PTA by wife who states pt went to work out in their home gym. pt found down by wife, 911 called. upon EMS arrival, [facility] had initiated CPR for approximately 5 minutes. AED applied by EMS, 1 shock from EMS. upon arrival to ED, pt in asystole. pt brought straight to trauma for eval. pt presents to ED in cardiac arrest. per EMS, pt was last seen 3 hours PTA by wife who states pt went to work out in their home gym. pt found down by wife, 911 called. upon EMS arrival, [facility] had initiated CPR for approximately 5 minutes. AED applied by EMS, 1 shock from EMS. upon arrival to ED, pt in asystole. pt brought straight to trauma for eval. Ems states patient wife states he was last seen at 9:30 am this morning alert and oriented, went down to basement gym to workout, at 12:30 pm today patient was found by spouse on floor, received patient in ER with CPR in progress, face cyanotic and extremities mottled.

## 2021-04-03 NOTE — ED PROVIDER NOTE - OBJECTIVE STATEMENT
72 y/o male with a PMHx of CAD s/p stents x2, CVA in 2019, HTN, HLD presents to the ED BIBEMS from home in cardiac arrest. Per EMS, pt went down to the basement to workout around 9:30am today. Family found pt unresponsive at 12:30pm. 72 y/o male with a PMHx of Afib, CAD s/p stents x2, CVA in 2019, HTN, HLD presents to the ED BIBEMS from home in cardiac arrest. Per EMS, pt went down to the basement to workout around 9:30am today. Family found pt unresponsive at 12:30pm. 72 y/o male with a PMHx of Afib, CAD s/p stents x2, CVA s/p TPA in 2019, HTN, HLD presents to the ED BIBEMS from home in cardiac arrest. Per EMS, pt went down to the basement to workout around 9:30am today. Family found pt unresponsive at 12:30pm. 70 y/o male with a PMHx of Afib, CAD s/p stents x2, CVA s/p TPA in 2019, HTN, HLD presents to the ED BIBEMS from home in cardiac arrest. Per EMS, pt was last seen 3 hours PTA by wife who states pt went to work out in their home gym. Pt found unresponsive by family. Upon EMS arrival, pt in asystole. AED applied and one shock given. 72 y/o male with a PMHx of Afib, CAD s/p stents x2, CVA s/p TPA in 2019, HTN, HLD presents to the ED BIBEMS from home in cardiac arrest. Per EMS, pt was last seen 3 hours PTA by wife who states pt went to work out in their home gym. Pt found unresponsive by family. Upon EMS arrival, pt in asystole. AED applied and one shock given. Required my immediate response.

## 2021-04-03 NOTE — ED PROVIDER NOTE - CLINICAL SUMMARY MEDICAL DECISION MAKING FREE TEXT BOX
70 yo male s/p cardiac arrest at home; multiple rounds of CPR without ROSC; pronounced at 13:22; I spoke to wife- Mary Anne who was notified and is aware of patient's death.

## 2021-04-03 NOTE — ED PROVIDER NOTE - PROGRESS NOTE DETAILS
Nadia Andrew for attending Dr. Pearce: Time of death 13:22. Nadia Andrew for attending Dr. Pearce: intubated by myself upon arrival; multiple rounds of cpr with no rosc; Time of death 13:22.

## 2022-09-16 NOTE — DISCHARGE NOTE ADULT - MEDICATION SUMMARY - MEDICATIONS TO STOP TAKING
Pt informed to decrease Lantus to 10 units & keep all other medications the same    Pt verbally understands & medication list updated I will STOP taking the medications listed below when I get home from the hospital:  None
